# Patient Record
Sex: MALE | Race: BLACK OR AFRICAN AMERICAN | NOT HISPANIC OR LATINO | Employment: FULL TIME | URBAN - METROPOLITAN AREA
[De-identification: names, ages, dates, MRNs, and addresses within clinical notes are randomized per-mention and may not be internally consistent; named-entity substitution may affect disease eponyms.]

---

## 2020-07-28 ENCOUNTER — TELEPHONE (OUTPATIENT)
Dept: BARIATRICS | Facility: CLINIC | Age: 55
End: 2020-07-28

## 2020-07-28 NOTE — TELEPHONE ENCOUNTER
----- Message from Mata Herring sent at 7/28/2020  1:05 PM CDT -----  Patient called stating he was returning a missed call from Griselda regarding scheduling for 8/10, (next available date in epic was 8/13)requesting a callback [phone]

## 2020-07-28 NOTE — TELEPHONE ENCOUNTER
Called pt to let him know that banee will be virtual. Instructed pt on virtual appts and provided number to activate my [employer] account. Scheduled appts with prudence and RD for next and will switch to virtual once he activates my och

## 2020-07-28 NOTE — TELEPHONE ENCOUNTER
Called to schedule comp nut consult. Pt states he was told by Rick Herring that prudence will not be in clinic day of his appt and appt needs to be rescheduled or with a different provider. Will look into this. Pt coming from , would prefer virtual appts if possible. Will try to accomodate. Will call pt back once I confirm consult to schedule  Nut cons

## 2020-08-04 ENCOUNTER — CLINICAL SUPPORT (OUTPATIENT)
Dept: BARIATRICS | Facility: CLINIC | Age: 55
End: 2020-08-04
Payer: COMMERCIAL

## 2020-08-04 ENCOUNTER — OFFICE VISIT (OUTPATIENT)
Dept: BARIATRICS | Facility: CLINIC | Age: 55
End: 2020-08-04
Payer: COMMERCIAL

## 2020-08-04 VITALS — WEIGHT: 305 LBS | HEIGHT: 75 IN | BODY MASS INDEX: 37.92 KG/M2

## 2020-08-04 DIAGNOSIS — M19.90 ARTHRITIS: ICD-10-CM

## 2020-08-04 DIAGNOSIS — C80.1 CANCER: ICD-10-CM

## 2020-08-04 DIAGNOSIS — I25.10 CORONARY ARTERY DISEASE, ANGINA PRESENCE UNSPECIFIED, UNSPECIFIED VESSEL OR LESION TYPE, UNSPECIFIED WHETHER NATIVE OR TRANSPLANTED HEART: ICD-10-CM

## 2020-08-04 DIAGNOSIS — F41.9 ANXIETY: ICD-10-CM

## 2020-08-04 DIAGNOSIS — I10 HYPERTENSION, UNSPECIFIED TYPE: ICD-10-CM

## 2020-08-04 PROBLEM — F32.A DEPRESSION: Status: ACTIVE | Noted: 2020-08-04

## 2020-08-04 PROBLEM — I25.2 HISTORY OF MI (MYOCARDIAL INFARCTION): Status: ACTIVE | Noted: 2020-08-04

## 2020-08-04 PROBLEM — E66.01 MORBID OBESITY: Status: ACTIVE | Noted: 2020-05-12

## 2020-08-04 PROBLEM — Z85.46 PERSONAL HISTORY OF MALIGNANT NEOPLASM OF PROSTATE: Status: ACTIVE | Noted: 2020-08-04

## 2020-08-04 PROCEDURE — 99205 PR OFFICE/OUTPT VISIT, NEW, LEVL V, 60-74 MIN: ICD-10-PCS | Mod: 95,,, | Performed by: PHYSICIAN ASSISTANT

## 2020-08-04 PROCEDURE — 99499 UNLISTED E&M SERVICE: CPT | Mod: 95,,, | Performed by: DIETITIAN, REGISTERED

## 2020-08-04 PROCEDURE — 99205 OFFICE O/P NEW HI 60 MIN: CPT | Mod: 95,,, | Performed by: PHYSICIAN ASSISTANT

## 2020-08-04 PROCEDURE — 99499 NO LOS: ICD-10-PCS | Mod: 95,,, | Performed by: DIETITIAN, REGISTERED

## 2020-08-04 RX ORDER — ATORVASTATIN CALCIUM 40 MG/1
40 TABLET, FILM COATED ORAL EVERY MORNING
COMMUNITY

## 2020-08-04 RX ORDER — CARVEDILOL 6.25 MG/1
6.25 TABLET ORAL 2 TIMES DAILY
COMMUNITY

## 2020-08-04 RX ORDER — TRIAMTERENE/HYDROCHLOROTHIAZID 37.5-25 MG
TABLET ORAL
COMMUNITY
End: 2020-12-22 | Stop reason: ALTCHOICE

## 2020-08-04 RX ORDER — CLOPIDOGREL BISULFATE 75 MG/1
TABLET ORAL
COMMUNITY

## 2020-08-04 RX ORDER — HYDRALAZINE HYDROCHLORIDE 25 MG/1
25 TABLET, FILM COATED ORAL EVERY 12 HOURS
COMMUNITY

## 2020-08-04 RX ORDER — ESCITALOPRAM OXALATE 20 MG/1
20 TABLET ORAL EVERY MORNING
COMMUNITY

## 2020-08-04 RX ORDER — BUPROPION HYDROCHLORIDE 150 MG/1
150 TABLET ORAL EVERY MORNING
COMMUNITY

## 2020-08-04 RX ORDER — OLMESARTAN MEDOXOMIL 40 MG/1
40 TABLET ORAL EVERY MORNING
COMMUNITY

## 2020-08-04 NOTE — PROGRESS NOTES
The patient location is: home ([place])  The chief complaint leading to consultation is: morbid obesity    Visit type: audiovisual    Face to Face time with patient: 60 min  60 minutes of total time spent on the encounter, which includes face to face time and non-face to face time preparing to see the patient (eg, review of tests), Obtaining and/or reviewing separately obtained history, Documenting clinical information in the electronic or other health record, Independently interpreting results (not separately reported) and communicating results to the patient/family/caregiver, or Care coordination (not separately reported).         Each patient to whom he or she provides medical services by telemedicine is:  (1) informed of the relationship between the physician and patient and the respective role of any other health care provider with respect to management of the patient; and (2) notified that he or she may decline to receive medical services by telemedicine and may withdraw from such care at any time.    NUTRITIONAL CONSULT    Referring Physician: Dr. Canales  Reason for MNT Referral: Initial assessment for sleeve gastrectomy work-up    PAST MEDICAL HISTORY:   55 y.o. male. BMI 38.  States he has not always been overweight but with age and worsening joint pain he has gained weight.  The patient has tried balanced diet with three meals a day, exercising, portion control. Lowest adult weight. Presently at highest weight.     Pt states that he was working with a  last year at the gym, 4 days/week. She discussed exercise, diet and behavior changes with him at each session. He will attempt to get us the notes from these sessions to review and send to insurance as part of intensive multicomponent behavioral intervention.    Past Medical History:   Diagnosis Date    Anxiety     Arthritis     Cancer     Coronary artery disease     Depression 8/4/2020    Hyperlipidemia     Hypertension        CLINICAL  DATA:  55 y.o.-year-old Black or  male.  Height: 6 ft. 3 in.  Weight: 305 lbs  BMI: 38.1    Goal for Bariatric Surgery: to improve health, to improve quality of life, to lose weight and to prevent future medical conditions    NUTRITIONAL NEEDS: Pre op for bariatric surgery  [id number] Calories    Grams Protein    NUTRITION & HEALTH HISTORY:  Greatest challenge:     Current diet recall:    B: egg whites  L: tuna salad scoop over salad  - piece of fruit  D: tuna salad scoop with back of chips    Current Diet:  Meal pattern: 3 meals + snack  Protein supplements: Has used in the past  Snacking: Fruit. Eats chips very rarely, once/month. Not a big sweet tooth. Occ eats a chocolate bar or cookies less than once/month.  Vegetables: Likes a variety. Eats daily.  Fruits: Likes a variety. Eats daily.  Beverages: water and coffee without sugar. Soda once/month.  Dining out: Monthly. Mostly fast food, restaurants and take-out. Big Mac, or Grilled chicken and vegetables and potato from a restaurant.  Cooking at home: Daily. Weekly. Mostly baked, grilled and smothered meat, fish and vegetables.    Exercise:  Past exercise: 2019 4 day/sweek at the gym, led by a : stationary bike, walking on treadmill, circuit weights - low weight/high reps    Current Exercise: None. Has gym membership with stationary bike but has not been going d/t covid. No pool access. No gym equipment at home. He tries to go for walks occ but is very limited. He has tried seated chair exercise videos  Restrictions to exercise: knees and lower back pain, arthritis    Vitamins / Minerals / Herbs:  MV 50+      Labs:   none available at this time    Food Allergies:   None known    Social:  Works regular daytime shifts. Mostly seated.  Lives alone.  Grocery shopping and food prep done by the pt.  Patient unsure who will be supportive after surgery. He plans to take an Uber.  Alcohol: Socially.  Smoking:  None.    ASSESSMENT:  · Patient reports attempts at weight loss, only to regain lost weight.  · Patient demonstrated knowledge of healthy eating behaviors and exercise patterns; admits to not eating healthy and not exercising at this point.  · Patient demonstrates willingness to change lifestyle and make behavior modifications as evidenced by dietary changes, increased fruits, increased vegetables, more food preparation at [employer], healthier cooking at home and healthier snacking at home.        Barriers to Education: none    Stage of change: action    NUTRITION DIAGNOSIS:     Morbid Obesity related to Physical inactivity as evidence by BMI.    BARIATRIC DIET DISCUSSION/PLAN:  Discussed diet after surgery and related to patient's food record.  Reviewed nutrition guidelines for before and after surgery.  Answered all questions.  Continue to review Bariatric Nutrition Guidebook at home and call with any questions.  Work on Bariatric Nutrition Checklist.  Begin trying various protein supplements to determine preference.  5-6 meals per day.  Start including protein supplements in the diet plan daily.  Increase exercise.  Start shopping for bariatric vitamins & minerals.     Focus Goals:  Begin including protein shake 1-2 daily - lactose free list provided.  Recommended [employer] or [employer] seated chair exercise videos.    RECOMMENDATIONS:  Patient is Good candidate for bariatric surgery.    May need additional visit(s) with RD for MSD.    Patient verbalized understanding.    Expect good  compliance after surgery at this time.    Communicated nutrition plan with bariatric team.    SESSION TIME:  60 minutes

## 2020-08-04 NOTE — PROGRESS NOTES
BARIATRIC NEW PATIENT EVALUATION  The patient location is: home  The chief complaint leading to consultation is: consult  Visit type: audiovisual    Face to Face time with patient: 30 minutes of total time spent on the encounter, which includes face to face time and non-face to face time preparing to see the patient (eg, review of tests), Obtaining and/or reviewing separately obtained history, Documenting clinical information in the electronic or other health record, Independently interpreting results (not separately reported) and communicating results to the patient/family/caregiver, or Care coordination (not separately reported).         Each patient to whom he or she provides medical services by telemedicine is:  (1) informed of the relationship between the physician and patient and the respective role of any other health care provider with respect to management of the patient; and (2) notified that he or she may decline to receive medical services by telemedicine and may withdraw from such care at any time.    Notes:   CHIEF COMPLAINT:   Morbid Obesity Body mass index is 38.12 kg/m². and inability to maintain weight loss.    HISTORY OF PRESENT ILLNESS:  Cb Hernandez  is a 55 y.o. male presenting for morbid obesity Body mass index is 38.12 kg/m². and inability to maintain weight loss. Pt states that he has not always been overweight but with age and worsening joint pain he has gained weight.  The patient has tried balanced diet with three meals a day, exercising, portion control. Lowest adult weight. Presently at highest weight.     Pt goal: 250-260  IBW: 195    PAST MEDICAL HISTORY:  Past Medical History:   Diagnosis Date    Anxiety     Arthritis     Cancer     Coronary artery disease     Depression 8/4/2020    Hyperlipidemia     Hypertension          PAST SURGICAL HISTORY:  Past Surgical History:   Procedure Laterality Date    ELBOW SURGERY Bilateral     KNEE SURGERY Bilateral     PROSTATE SURGERY       SHOULDER SURGERY Bilateral        FAMILY HISTORY:  Family History   Problem Relation Age of Onset    Diabetes Mother     No Known Problems Father     No Known Problems Sister     No Known Problems Brother     No Known Problems Daughter     No Known Problems Son     No Known Problems Brother     No Known Problems Son        SOCIAL HISTORY:  Social History     Socioeconomic History    Marital status: Single     Spouse name: Not on file    Number of children: Not on file    Years of education: Not on file    Highest education level: Not on file   Occupational History    Not on file   Social Needs    Financial resource strain: Not on file    Food insecurity     Worry: Not on file     Inability: Not on file    Transportation needs     Medical: Not on file     Non-medical: Not on file   Tobacco Use    Smoking status: Never Smoker    Smokeless tobacco: Never Used   Substance and Sexual Activity    Alcohol use: Yes     Comment: socially    Drug use: Never    Sexual activity: Yes     Partners: Female   Lifestyle    Physical activity     Days per week: Not on file     Minutes per session: Not on file    Stress: Not on file   Relationships    Social connections     Talks on phone: Not on file     Gets together: Not on file     Attends Buddhism service: Not on file     Active member of club or organization: Not on file     Attends meetings of clubs or organizations: Not on file     Relationship status: Not on file   Other Topics Concern    Not on file   Social History Narrative    Not on file       MEDICATIONS:  Current Outpatient Medications   Medication Sig Dispense Refill    atorvastatin (LIPITOR) 40 MG tablet 1 tablet      buPROPion (WELLBUTRIN XL) 150 MG TB24 tablet 1 tablet in the morning      carvediloL (COREG) 25 MG tablet 2 tablet      clopidogreL (PLAVIX) 75 mg tablet 1 tablet      escitalopram oxalate (LEXAPRO) 20 MG tablet 1 tablet      hydrALAZINE (APRESOLINE) 25 MG tablet 1  tablet      olmesartan (BENICAR) 40 MG tablet 1 tablet      triamterene-hydrochlorothiazide 37.5-25 mg (MAXZIDE-25) 37.5-25 mg per tablet 1 tablet in the morning       No current facility-administered medications for this visit.        ALLERGIES:  Review of patient's allergies indicates:  Not on File    ROS:  Review of Systems   Constitutional: Negative for chills and fever.   HENT: Negative for sore throat and tinnitus.    Eyes: Negative for blurred vision and double vision.   Respiratory: Negative for cough and shortness of breath.    Cardiovascular: Negative for chest pain, palpitations and leg swelling.   Gastrointestinal: Negative for abdominal pain, constipation, diarrhea, heartburn, nausea and vomiting.   Genitourinary: Negative for dysuria and hematuria.   Musculoskeletal: Positive for back pain and joint pain. Negative for neck pain.   Skin: Negative for rash.   Neurological: Negative for dizziness, tingling and headaches.   Psychiatric/Behavioral: Negative for depression. The patient is not nervous/anxious.        PE:  There were no vitals filed for this visit.    Physical Exam  Constitutional:       Appearance: He is obese.   HENT:      Head: Normocephalic and atraumatic.      Nose: Nose normal.   Eyes:      Extraocular Movements: Extraocular movements intact.      Conjunctiva/sclera: Conjunctivae normal.   Neck:      Musculoskeletal: Normal range of motion.   Pulmonary:      Effort: Pulmonary effort is normal. No respiratory distress.   Neurological:      General: No focal deficit present.      Mental Status: He is alert and oriented to person, place, and time.   Psychiatric:         Mood and Affect: Mood normal.         Behavior: Behavior normal.         Thought Content: Thought content normal.         Judgment: Judgment normal.          DIAGNOSIS:  1. Morbid Obesity with Body mass index is 38.12 kg/m². and inability to maintain weight loss.  2. Co-morbidities: HLD,HTN    PLAN:  The patient is Good  candidate for Bariatric Surgery. he is interested in sleeve gastrectomy with Dr. Canales. The surgery and post-op care were discussed in detail with the patient. All questions were answered.    he understands that bariatric surgery is a tool to aid in weight loss and that he needs to be committed to the diet and exercise post-operatively for successful weight loss.  Discussed expected weight loss outcomes after surgery which is 50% of the excess weight on his frame.  Goal weight is 250#s.  Discussed with patient that bariatric surgery is not the easy way out and that it will take plenty of dedication on the patient's part to be successful. Also discussed the possibility of weight regain if the patient strays from the diet guidelines or exercise requirements. Patient verbalized understanding and wishes to proceed with the work-up.    ORDERS:  1. Chest X-Ray and EKG  2. Psychological Consult, Bariatric Dietician Consult and PCP Clearance  3. Bariatric Labs: BMP, CBC, Folate Serum, H. pylori, HgA1C, Hepatic Panel/LFT, Iron & TIBC, Lipid Profile, Magnesium, Phosphate, T3, T4, TSH, Free T4, Vitamin B12, and Vitamin B1.  4. No NSAIDS after surgery due to increased risk of stomach ulcers. Talk to your prescribing provider about alternative options for your pain.  5.Anticoagulation plan  6. ESS scale for sleep apnea completed by patient and reviewed.   Primary Physician: Bruce Lozoya MD  RTC: As scheduled.    60 minute visit, over 50% of time spent counseling patient face to face on surgical options, risks, benefits, expected diet, recommended exercise regimen, and expected weight loss.

## 2020-08-04 NOTE — PATIENT INSTRUCTIONS
Prior to surgery you will need to complete:  - Dietitian consult and follow up appointments as needed  - PCP clearance  - Labs  - Chest X-ray  - EKG  - Psychological evaluation, Please call psychiatry [phone] to schedule     In preparation for bariatric surgery, please complete the following:   · Discuss your current medications with your primary care provider, remember your medications will need to be crushed, chewable, or in liquid form for the first 3-6 months after a gastric bypass or sleeve.  For a gastric band, your medications will need to be crushed indefinitely.    · If you take a blood thinner such as: Coumadin (warfarin), Pradaxa (dabigatran), or Plavix (clopidogrel), you will need to speak with your prescribing provider on how or if this medication can be stopped before surgery.   · If you take a medication for depression or anxiety, you will need to begin crushing or opening the capsule 1-3 months prior to surgery.  Remember to discuss this with the psychologist or psychiatrist that you see.   · If you take medication for arthritis on a daily basis that is considered a non-steroidal anti-inflammatory (NSAID), please discuss with your prescribing physician an alternative medication.  After having gastric bypass or gastric sleeve, this group of medications is not appropriate to take due to increased risk of bleeding stomach ulcers.      DEFINITIONS  Appointments: Pre-scheduled meetings or consultations with any physician, advanced practice provider, dietitian, or psychologist, and labs, imaging studies, sleep studies, etc.   Late cancellation: Cancelling an appointment 24-48 hours prior to scheduled time.  No-Show appointment:  is when    You do NOT arrive to your appointment at the time its scheduled.   You call to cancel or cancel via [facility] less than 24 hours in advance of your scheduled appointment   You show up 15 minutes AFTER your scheduled appointment time without any notification of  being late.     POLICY  1. You are allowed up to 3 cancellations for appointments.    After 3 cancellations your case will be placed on hold for 2 months and after that time you can resume the program.   2. You are allowed only 1 no-show for an appointment.    You will be re-scheduled one time and if there is a 2nd no-show at any point, your case will be placed on hold for 3 months.  After 3 months you can resume the program.     3. Upon resuming the program after being placed on hold for either above mentioned reasons, if you have a late cancel or no show for any appointment, the bariatric team will review if youre an appropriate candidate for surgery at the monthly meeting.

## 2020-08-05 NOTE — PATIENT INSTRUCTIONS
PLAN:  Continue to review Bariatric Nutrition Guidebook at home and call with any questions.  Work on Bariatric Nutrition Checklist.  Begin trying various protein supplements to determine preference.  5-6 meals per day.  Start including protein supplements in the diet plan daily.  Increase exercise.  Start shopping for bariatric vitamins & minerals.     Focus Goals:  Begin including protein shake 1-2 daily - lactose free list provided.  Recommended [facility] or You Tube seated chair exercise videos.    Lactose-free Protein Supplements: Muscle Milk, Fairlife nutrition plan or Core Power, Total Lean from [facility], Nectar protein powder, Isopure protein powder from [facility], Protein 2.0    1200- 1500 calorie Sample meal plan  80-120g protein per day.   Protein drinks and bars: 0-4 grams sugar  Drink lots of water throughout the day and exercise!  MENU # 1  Breakfast: 2 eggs, 1 turkey sausage hazel, 1 apple  Snack: Atkins bar  Lunch: 2 roll-ups (sliced turkey, low-fat sliced cheese, mustard), 12 baby carrots dipped in 1 Tbsp natural peanut butter  Mid-Day Snack: ¼ cup unsalted almonds, ½ cup fruit  Dinner: 1 chicken thigh simmered in tomato sauce + 2 Tbsp mozzarella cheese, ½ cup black beans, 1/2 cup steamed carrots  Evening Snack: 1/2 cup grapes with 4 cubes low-fat cheddar cheese   ___________________________________________________  MENU # 2  Breakfast: 200 calorie protein drink  Mid-morning snack : ¼ cup unsalted nuts, medium banana  Lunch: 3oz tuna or chicken salad made with 2 tbsp light higgins, over salad with tomatoes and cucumbers.   Snack: low-fat cheese stick  Dinner: 3oz hamburger hazel, slice of low-fat cheese, 1 cup boiled yellow squash and zucchini.   Snack: 6oz light yogurt  _______________________________________________________  Menu #3  Breakfast: 6oz plain Greek yogurt + fruit (½ banana, ½ cup fruit - pineapple, blueberries, strawberries, peach), may add Splenda to wes.  Lunch: Grilled  chicken breast w/ slice  low-fat pepper alicia cheese, 1/2 cup grilled/baked asparagus and small salad with Salad Spritzer.    Mid-Day snack: 200 calorie protein drink   Dinner: 4oz Grilled fish, ½ cup white beans, ½ cup cooked spinach  Evening Snack: fudgsicle no-sugar-added    Menu # 4  Breakfast: 1 scoop vanilla protein powder + 4oz skim milk + 4oz coffee   Snack: Pure protein bar  Lunch: 2 Lettuce tacos: 3oz seasoned ground turkey wrapped in a Ravindra lettuce leaves with 1 Tbsp shredded cheese and dollop low-fat sour cream  Snack: ½ cup cottage cheese, ½ cup pineapple chunks  Dinner: Shrimp omelet: 2 eggs, ½ cup shrimp, green onions, and shredded cheese  ______________________________________________________  Menu #5  Breakfast: 1 cup low-fat cottage cheese, ½ cup peaches (no sugar added)  Snack: 1 apple, 4 cubes cheese  Lunch: 3oz baked pork chop, 1 cup okra and tomato stew  Snack: 1/2 cup black beans + salsa + dollop light sour cream  Dinner: Caprese chicken salad: 3 oz chicken breast, 1oz fresh mozzarella, sliced tomato, 1 Tbsp olive oil, basil  Snack: sugar-free popsicle    Menu #6  Breakfast: ½ cup part-skim ricotta cheese, 2 Tbsp sugar-free strawberry preserves, sprinkle of slivered almonds  Snack: 1 orange  Lunch: 3 oz canned chicken, 1oz shredded cheddar cheese, ½  cup black beans, 2 Tbsp salsa  Snack: 200 calorie Protein drink  Dinner: 4 oz grilled salmon steak, over mixed green salad with 2 tbsp light dressing    Meal Ideas for Regular Bariatric Diet  *Recipes and products available at [email or web address]      Breakfast: (15-20g protein)    - Egg white omelet: 2 egg whites or ½ cup Egg Beaters. (Optional proteins: cheese, shrimp, black beans, chicken, sliced turkey) (Optional veggies: tomatoes, salsa, spinach, mushrooms, onions, green peppers, or small slice avocado)     - Egg and sausage: 1 egg or ¼ cup Egg Beaters (any variety), with 1 hazel or 2 links of Turkey sausage or Veggie breakfast sausage ([employer]se Farms or  Boca)    - Crust-less breakfast quiche: To make a glass pie dish, mix 4oz part skim Ricotta, 1 cup skim milk, and 2 eggs as your base. Add protein: shredded cheese, sliced lean ham or turkey, turkey watts/sausage. Add veggies: tomato, onion, green onion, mushroom, green pepper, spinach, etc.    - Yogurt parfait: Mix 1 - 6oz container Dannon Light N Fit vanilla yogurt, with ¼ cup crushed unsalted nuts    - Cottage cheese and fruit: ½ cup part-skim cottage cheese or ricotta cheese topped with fresh fruit or sugar free preserves     - Eda Burton's Vanilla Egg custard* (add 2 Tbsp instant coffee granules to make Cappuccino Custard*)    - Hi-Protein café latte (skim milk, decaf coffee, 1 scoop protein powder). Optional to add Sugar free syrup or extract flavoring.    - Breakfast Lox: spread fat free cream cheese on slices of smoked salmon. Serve over scrambled or egg over easy (sauteed with nonstick cookspray) OR on a cucumber slice    - Eggwhich: Scramble or cook 1 large egg over easy using nonstick cookspray. Place between 2 slices of Omani watts and low fat cheese.     Lunch: (20-30g protein)    - ½ cup Black bean soup (Homemade or Progresso), with ¼ cup shredded low-fat cheese. Top with chopped tomato or fresh salsa.     - Lean deli turkey breast and low-fat sliced cheese, mustard or light higgins to moisten, rolled up together, or wrapped in a Ravindra lettuce leaf    - Chicken salad made from dinner leftovers, moisten with low-fat salad dressing or light higgins. Also try leftover salmon, shrimp, tuna or boiled eggs. Serve ½ cup over dark green salad    - Fat-free canned refried beans, topped with ¼ cup shredded low-fat cheese. Top with chopped tomato or fresh salsa.     - Greek salad: Top mixed greens with 1-2oz grilled chicken, tomatoes, red onions, 2-3 kalamata olives, and sprinkle lightly with feta cheese. Spritz with Balsamic vinegar to taste.     - Crust-less lunch quiche: To make a glass pie dish, mix 4oz part  skim Ricotta, 1 cup skim milk, and 2 eggs as your base. Add protein: shredded cheese, sliced lean ham or turkey, shrimp, chicken. Add veggies: tomato, onion, green onion, mushroom, green pepper, spinach, artichoke, broccoli, etc.    - Pizza bake: spread a  delfin yran mushroom with tomato sauce, low-fat shredded mozzarella and turkey pepperoni or Slovenian watts. Add any veggies. Roast for 10-15 minutes, until cheese melted.     - Cucumber crab bites: Spread ¼ cup crab dip (lump crabmeat + light cream cheese and green onions) over sliced cucumber.     - Chicken with light spinach and artichoke dip*: Puree in : 6oz cooked and drained spinach, 2 cloves garlic, 1 can cannelloni beans, ½ cup chopped green onions, 1 can drained artichoke hearts (not marinated in oil), lemon juice and basil. Mix in 2oz chopped up chicken.    Supper: (20-30g protein)    - Serve grilled fish over dark green salad tossed with low-fat dressing, served with grilled asparagus pickering     - Rotisserie chicken salad: served with sliced strawberries, walnuts, fat-free feta cheese crumbles and 1 tbsp Haros Own Light Raspberry [place] Vinaigrette    - Shrimp cocktail: Dip cold boiled shrimp in homemade low-sugar cocktail sauce (1/2 cup Lea One Carb ketchup, 2 tbsp horseradish, 1/4 tsp hot sauce, 1 tsp Worcestershire sauce, 1 tbsp freshly-squeezed lemon juice). Serve with dark green salad, walnuts, and crumbled blue cheese drizzled with olive oil and Balsamic vinegar    - Tuna Melt: Spread tuna salad onto 2 thick slices of tomato. Top with low-fat cheese and broil until cheese is melted. May also be made with chicken salad of shrimp salad. [place] with different types of cheeses.    - Chicken or beef fajitas (no tortilla, rice, beans, chips). Top meat and veggies w/ fresh salsa, fat free sour cream.     - Homemade low-fat Chili using extra lean ground beef or ground turkey. Top with shredded cheese and salsa as desired. May add  dollop fat-free sour cream if desired    - Chicken parmesan: Top chicken breast w/ low sugar marinara sauce, mozzarella cheese and bake until chicken reaches 165*.  Serve w/ spaghetti SQUASH or Armenian cut green beans    - Dinner Omelet with shrimp or chicken and onion, green peppers and chives.    - No noodle lasagna: Use sliced zucchini or eggplant in place of noodles.  Layer with part skim ricotta cheese and low sugar meat sauce (use very lean ground beef or ground turkey).    - Mexican chicken bake: Bake chunks of chicken breast or thigh with taco seasoning, Pace brand enchilada sauce, green onions and low-fat cheese. Serve with ¼ cup black beans or fat free refried beans topped with chopped tomatoes or salsa.    - Rogers frozen meatballs, simmered in Classico Marinara sauce. Different flavors of salsa or spaghetti sauce create different dishes! Sprinkle with parmesan cheese. Serve with grilled or steamed veggies, or a dark green salad.    - Simmer boneless skinless chicken thigh chunks in Classico Marinara sauce or roasted salsa until tender with chopped onion, bell pepper, garlic, mushrooms, spinach, etc.     - Hamburger or veggie burger, without the bun, dressed the way you like. Served with grilled or steamed veggies.    - Eggplant parmesan: Bake slices of eggplant at 350 degrees for 15 minutes. Layer tomato sauce, sliced eggplant and low-fat mozzarella cheese in a baking dish and cover with foil. Bake 30-40 more minutes or until bubbly. Uncover and bake at 400 degrees for about 15 more minutes, or until top is slightly crisp.    - Fish tacos: grilled/baked white fish, wrapped in Ravindra lettuce leaf, topped with salsa, shredded low-fat cheese, and light coleslaw.    - Chicken savage: Sprinkle chicken w/ 1 tsp of hidden [place] ranch dip mix. Then grill chicken and top with black beans, salsa and 1 tsp fat free sour cream.     - Cauliflower pizza crust: Use cauliflower as crust (see recipe on jack,  no flour!). Top w/ low fat cheese, turkey pepperoni and veggies and bake again    - chicken or turkey crust pizza: use ground chicken or turkey instead of cauliflower, spread in [place] and bake at 350 for about 20-30 minutes(may want to add garlic, black pepper, oregano and other herbs to ground meat mixture).  Remove and top w/ low fat cheese, turkey pepperoni and veggies and bake again for another 10 minutes or until cheese is browned.     Snacks: (100-200 calories; >5g protein)    - 1 low-fat cheese stick with 8 cherry tomatoes or 1 serving fresh fruit  - 4 thin slices fat-free turkey breast and 1 slice low-fat cheese  - 4 thin slices fat-free honey ham with wedge of melon  - 6-8 edamame pods (equivalent to about 1/4 cup edamame without pods).   - 1/4 cup unsalted nuts with ½ cup fruit  - 6-oz container Dannon Light n Fit vanilla yogurt, topped with 1oz unsalted nuts         - apple, celery or baby carrots spread with 2 Tbsp PB2  - apple slices with 1 oz slice low-fat cheese  - Apple slices dipped in 2 Tbsp of PB2  - celery, cucumber, bell pepper or baby carrots dipped in ¼ cup hummus bean spread or light spinach and artichoke dip (*recipe in lunch section)  - celery, cucumber, baby carrots dipped in high protein greek yogurt (Mix 16 oz plain greek yogurt + 1 packet of hidden valley ranch dip mix)  - Ephraim Links Beef Steak - 14g protein! (similar to beef jerky)  - 2 wedges Laughing Cow - Light Herb & Garlic Cheese with sliced cucumber or green bell pepper  - 1/2 cup low-fat cottage cheese with ¼ cup fruit or ¼ cup salsa  - RTD Protein drinks: Atkins, Low Carb Slim Fast, EAS light, Muscle Milk Light, etc.  - Homemade Protein drinks: GNC Soy95, Isopure, Nectar, UNJURY, Whey Gourmet, etc. Mix 1 scoop powder with 8oz skim/1% milk or light soymilk.  - Protein bars: Atkins, EAS, Pure Protein, Think Thin, Detour, etc. Must have 0-4 grams sugar - Read the label.    Takeout Options: No more than twice/week  Deli - Lisa  (no pasta or rice), meats, cheeses. Roasted chicken. Lox (salmon)    Mexican - Platters which don't include tortillas, chips, or rice. Go easy on the beans. Example: Fajitas without the tortillas. Ask the  not to bring chips to the table if they are too tempting.    Greek - Meat or fish and vegetable, but no bread or rice. Including hummus, baba ganoush, etc, is OK. Most sit-down Greek restaurants can provide you with cucumber slices for dipping instead of salty bread.    Fast Food (Avoid as much as possible) - Salads (no croutons and limit salad dressing to 2 tbsp), grilled chicken sandwich without the bun and ask for no higgins. Margaritas low fat chili or Taco Bell pintos and cheese.    BBQ - The meats are fine if you ask for sauces on the side, but most of the traditional side dishes are loaded with carbs. Sumeet slaw, baked beans and BBQ sauce are typically made with sugar.    Chinese - Nothing deep-fried, no rice or noodles. Many Chinese sauces have starch and sugar in them, so you'll have to use your judgement. If you find that these sauces trigger cravings, or cause Dumping, you can ask for the sauce to be made without sugar or just use soy sauce.

## 2020-08-07 ENCOUNTER — TELEPHONE (OUTPATIENT)
Dept: BARIATRICS | Facility: CLINIC | Age: 55
End: 2020-08-07

## 2020-08-07 NOTE — TELEPHONE ENCOUNTER
----- Message from Faith Rudolph PA-C sent at 8/7/2020 12:50 PM CDT -----  Why does he need either?  ----- Message -----  From: Kenisha Miranda RN  Sent: 8/6/2020   4:54 PM CDT  To: Faith Rudolph PA-C    Should we get a stress test or cardiology clearance too plese.  Thanks  ----- Message -----  From: Faith Rudolph PA-C  Sent: 8/4/2020   8:32 AM CDT  To: Klaud[facility] Cuevas Staff    Please contact pt to set up consult orders.  BM

## 2020-08-10 ENCOUNTER — OFFICE VISIT (OUTPATIENT)
Dept: PSYCHIATRY | Facility: CLINIC | Age: 55
End: 2020-08-10
Payer: COMMERCIAL

## 2020-08-10 DIAGNOSIS — I25.2 HISTORY OF MI (MYOCARDIAL INFARCTION): ICD-10-CM

## 2020-08-10 DIAGNOSIS — M19.90 ARTHRITIS: ICD-10-CM

## 2020-08-10 DIAGNOSIS — E66.01 MORBID OBESITY DUE TO EXCESS CALORIES: ICD-10-CM

## 2020-08-10 DIAGNOSIS — Z01.818 PREOP EXAMINATION: Primary | ICD-10-CM

## 2020-08-10 DIAGNOSIS — I25.10 CORONARY ARTERY DISEASE, ANGINA PRESENCE UNSPECIFIED, UNSPECIFIED VESSEL OR LESION TYPE, UNSPECIFIED WHETHER NATIVE OR TRANSPLANTED HEART: ICD-10-CM

## 2020-08-10 DIAGNOSIS — I10 ESSENTIAL HYPERTENSION: ICD-10-CM

## 2020-08-10 DIAGNOSIS — Z56.6 WORK-RELATED STRESS: ICD-10-CM

## 2020-08-10 DIAGNOSIS — Z86.59 HISTORY OF ANXIETY: ICD-10-CM

## 2020-08-10 PROCEDURE — 90791 PR PSYCHIATRIC DIAGNOSTIC EVALUATION: ICD-10-PCS | Mod: 95,,, | Performed by: PSYCHOLOGIST

## 2020-08-10 PROCEDURE — 90791 PSYCH DIAGNOSTIC EVALUATION: CPT | Mod: 95,,, | Performed by: PSYCHOLOGIST

## 2020-08-10 SDOH — SOCIAL DETERMINANTS OF HEALTH (SDOH): OTHER PHYSICAL AND MENTAL STRAIN RELATED TO WORK: Z56.6

## 2020-08-10 NOTE — PROGRESS NOTES
[employer] ([place]) - Direct to [email or web address]  The patient location is: Home ([place])  The chief complaint leading to consultation is: Presurgical psychological evaluation prior to bariatric surgery    Visit type: audiovisual    Face to Face time with patient: 35  50 minutes of total time spent on the encounter, which includes face to face time and non-face to face time preparing to see the patient (eg, review of tests), Obtaining and/or reviewing separately obtained history, Documenting clinical information in the electronic or other health record, Independently interpreting results (not separately reported) and communicating results to the patient/family/caregiver, or Care coordination (not separately reported).     Each patient to whom he or she provides medical services by telemedicine is:  (1) informed of the relationship between the physician and patient and the respective role of any other health care provider with respect to management of the patient; and (2) notified that he or she may decline to receive medical services by telemedicine and may withdraw from such care at any time.    Notes: N/A    Psychiatry Initial Visit (PhD/LCSW)   Diagnostic Interview - CPT 89057     NAME: Cb Hernandez  MRN: [id number]  : 1965    Date: 08/10/2020    Site: [facility]     Referral source: Ze Canales M.D.    Clinical status of patient: Outpatient     Mr. Cb Hernandez, a 55-year-old male, presented for initial evaluation visit. Before this evaluation was initiated, the purposes and process of the assessment and the limits of confidentiality were discussed with the patient who expressed understanding of these issues and orally consented to proceed with the evaluation.     Chief complaint/reason for encounter: Routine psychological evaluation prior to bariatric surgery.     Type of surgery sought:  Sleeve    History of present illness:  Mr. Cb Hernandez is a 55-year-old male who is pursuing bariatric  surgery to improve his health and quality of life.  He reports a history of anxiety from work stress and marital issues, for which he was prescribed psychotropic medication by his PCP.  Although both stressors have resolved, he continues to take the medication for maintenance.  He attended marital counseling prior to his divorce and individual psychotherapy during his divorce, but otherwise denied past psychiatric history and treatment.  He currently reports no significant psychiatric distress or adjustment issues.  He denied any history of suicidality or non-suicidal self-injury.  He has attempted making positive lifestyle changes in anticipation for surgery, with reported benefit.  The patient has a Body Mass Index of 38.12 as documented by the referring provider.    Mr. Hernandez has struggled with weight on and off for the past 10 years.  Factors that have contributed to his weight gain over the years include:  Lack of exercise (because I would be hurt or have some type of injury).  He did not identify many problematic eating patterns or dietary issues.  He denied problems with late-night eating and grazing behaviors.  Mr. Hernandez does not consider himself an emotional eater.  He has tried many weight loss methods on his own (i.e., balanced diet, exercise, portion control) with limited success, and believes that his biggest weight loss challenge is lack of exercise due to sports injuries (I have had 5 knee surgeries, both elbows, both shoulders theyre all arthritic.).  His motivation for seeking surgery now is improve health and longevity (to add more days to my life).  His postsurgical goals include walking his 17-year-old daughter down the aisle one day, reducing medication, reducing pressure from his joints, and feeling more energetic.    Mr. Hernandez had an initial consultation with Faith Rudolph PA-C, on 08/04/2020.  He also met with bariatric nutritionist Griselda Curiel RD, and reports that he has  made the following lifestyle changes since beginning the bariatric program:  Eating vegetables, eating chicken, portion control, drinking water, and walking more.  He has been drinking protein shakes for lunch for the last week.  He must continue meeting with Ms. Curiel to demonstrate the implementation of lifestyle changes prior to clearance for bariatric surgery.  He chose the gastric sleeve because it would be less invasive than the gastric bypass surgery.  He described the procedures as cutting a portion of your stomach out and making it smaller.  He identified risks of surgery including leakage.  He understood that he needs to focus on protein intake after surgery, which includes chicken, lean meats.  He noted that he will need to stay away from fried foods, fatty foods, junk after surgery.  He hopes to lose 30-40 lb. and expects it will take about a year.  He plans to take a couple of days to recover after surgery.  He has not family in town, so he has no point person identified to help him during recovery.      Medical History:  Hypertension, unspecified type; Coronary artery disease, angina presence unspecified, unspecified vessel or lesion type, unspecified whether native or transplanted heart; Cancer; Arthritis    Current medications and drug reactions:  see medication list.    Pain:  7/10 (I have chronic back pain, chronic knee pain.)    Psychiatric Symptoms:  Depression:  His score on the PHQ-8 was a 0, which indicates minimal depression.  He denied episodes of depressed mood or depression-related anhedonia.  Based on his reports, his symptoms do not meet full criteria for Major Depressive Disorder.  Leatha/Hypomania:  Denied.  He denied periods of elevated mood or abnormally increased energy or goal-directed activity.  Anxiety:  His score on the ELHMA-7 was a 0, which indicates minimal anxiety.  He acknowledged experiencing anxiety several years ago when he was traveling 40 weeks per year  for work and dealing with marital issues.  He was placed on medication by his PCP in order to help reduce stress and hypertension.  Since he has  and stopped traveling for work, he notes that his anxiety and stress are at manageable levels.  He continues to be on medication for maintenance, but is hopeful he will be able to reduce it completely in the future.  Based on his reports, his symptoms do not meet full criteria for Generalized Anxiety Disorder.  He will be assigned diagnoses of History of Anxiety and Work Stress.  Thoughts:  Denied delusions, hallucinations.  Suicidal thoughts/behaviors:  Denied.  Self-injury:  Denied.  Sleep:  Denied problems.  His past problems with sleep apnea have resolved with a new bed.  Cognitive functioning:  Denied problems.  Eating disorders:  Bulimia:  He denied recurrent episodes of binge eating and inappropriate compensatory behaviors.    Binge-eating episodes:  He denied eating excessive amounts of food within a discrete time period with a lack of control during eating.      Current psychosocial stressors:  During the day, I have a high-pressure job.  Its not as stressful as when I was traveling.  I think my job is just stressful anyways its about a 5/10.  He denied stressors outside of work.  Report of coping skills:  I listen to music.  I try to go out for a walk.  Or I meditate.  I take deep breaths.  He enjoys golfing, going to the movies, and sitting by water.  Support system:  Its just me.  I depend on myself.  Strengths and liabilities:  Strength: Patient accepts guidance/feedback, Strength: Patient is expressive/articulate., Strength: Patient is intelligent., Strength: Patient is motivated for change., Strength: Patient has reasonable judgment., Strength: Patient is stable., Liability: Patient has no suport network.    Current and past substance use:  Alcohol: He drinks on social occasions approximately once per month (1.5 drinks); denied history of  abuse or dependency.   Drugs: Denied current use; denied history of abuse or dependency.  Tobacco: None.   Caffeine: He drinks one coffee per day.    Current Psychiatric Treatment:  Medications:  He is currently prescribed Wellbutrin and Lexapro by his primary care physician (Dr. Bruce Lozoya - outside of [facility], [phone]).  He has been taking these medications for the past 10-15 years.  He is taking medication due to hypertension and stress from high-stress jobs (We did this to make sure my stress levels stay off).  Psychotherapy:  Denied.    Psychiatric History:  Previous diagnosis:  Depression, Anxiety - I dont have a history of depression.  Yevgeniy never been diagnosed with depression.  I dont feel like Yevgeniy had that ever When I was traveling a lot, I was traveling 40 weeks a year that was when I was , but now Im .  Im not stressed.  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  But I just do that (medication) for maintenance.  Previous hospitalizations:  Denied.  History of outpatient treatment:  He attended marital counseling prior to his divorce.  He attended individual therapy during his divorce.  Previous suicide attempt:  Denied.  Family history of psychiatric illness:  None reported.    Trauma history:  Denied.    Stressors history:  He lost his mother in April, and lost his father a few years ago.    Social history (marriage, employment, etc.):  Mr. Hernandez was born and raised in [place], [place] by his biological mother and father along with two younger brothers and an older sister.  He described his childhood as great didnt have anything but we worked hard.  He denied childhood trauma, abuse, and neglect.  He did alright in school (C student) and earned a Bachelors degree in Business Administration.  He is currently working as a  in the automotive field.  He is not on disability and finances are stable.  He is not , but was  three years  ago.  He is currently single.  He has three children (ages 25, 25, 17).  He currently lives by himself.  Gnosticist is important to him and he identifies as Cheondoism.  He considers his epifanio to be a source of support.    Legal history:  Denied.    Mental Status Exam:   General appearance:  appears stated age, neatly dressed, well groomed  Speech:  normal rate and tone  Level of cooperation:  cooperative  Thought processes:  logical, goal-directed  Mood:  euthymic  Thought content:  no illusions, no visual hallucinations, no auditory hallucinations, no delusions, no active or passive homicidal thoughts, no active or passive suicidal ideation, no obsessions, no compulsions, no violence  Affect:  appropriate  Orientation:  oriented to person, place, and date  Memory:  Recent memory:  3 of 3 objects after brief delay.    Remote memory - intact  Attention span and concentration:  spelled HOUSE forward (ESOUOH backwards)  Fund of general knowledge: 3 of 3 recent presidents  Abstract reasoning:    Similarities: abstract.    Proverbs: abstract.  Judgment and insight: fair  Language:  intact    Diagnostic Impression:    ICD-10-CM ICD-9-CM   1. Preop examination  Z01.818 V72.84   2. Morbid obesity due to excess calories  E66.01 278.01   3. Essential hypertension  I10 401.9   4. Coronary artery disease, angina presence unspecified, unspecified vessel or lesion type, unspecified whether native or transplanted heart  I25.10 414.00   5. History of MI (myocardial infarction)  I25.2 412   6. Arthritis  M19.90 716.90   7. BMI 38.0-38.9,adult  Z68.38 V85.38   8. History of anxiety  Z86.59 V11.8   9. Work-related stress  Z56.6 V62.1       Summary/Conclusion:   There are no overt psychological contraindications for proceeding with bariatric surgery.  Mr. Hernandez has a history of anxiety (due to work and marital stress) that has been well-managed with psychotropic medication.  His stressors have also resolved so that he does not  experience any current psychiatric problems or major adjustment issues.  Regarding bariatric surgery, he has reasonable expectations for surgery and has already begun making appropriate lifestyle changes in anticipation for surgery.  He reports limited social support and was encouraged to consider the bariatric support group for assistance if needed.  He has verbalized appropriate awareness and commitment to the necessary behavioral changes associated with bariatric surgery and appears willing to comply with long-term lifestyle changes. There are no recommendations for psychological treatment at this time. Mr. Hernandez is aware of resources available should his needs change in the future.    Recommendations:    Clear   Mr. Hernandez is psychologically cleared to proceed with bariatric surgery.    Due to limited social support, Mr. Hernandez was encouraged to consider attending the bariatric support group.  This is not a requirement prior to clearance for surgery.   Recommendations    Conditions need to be met    Not a candidate        Please see Psychological Testing report available in Notes tab of Chart Review in Epic for results of psychological testing.      Length of Session:  35 minutes

## 2020-08-10 NOTE — LETTER
August 10, 2020        [facility] STAFF             [facility]  [address]  [facility] [address]  Phone: [phone]   Patient: Cb Hernandez   MR Number: [id number]   YOB: 1965   Date of Visit: 8/10/2020       Dear  Staff:    Thank you for referring Cb Hernandez to me for evaluation. Below are the relevant portions of my assessment and plan of care.    Mr. Hernandez is psychologically cleared to proceed with bariatric surgery.  He has a history of anxiety (due to work and marital stress) that has been well-managed with psychotropic medication.  His stressors have also resolved so that he does not experience any current psychiatric problems or major adjustment issues.  There are no recommendations for psychological intervention at this time.  Due to limited social support, Mr. Hernandez was encouraged to consider attending the bariatric support group.  This is not a requirement prior to clearance for surgery.       If you have questions, please do not hesitate to call me. I look forward to following Cb along with you.    Sincerely,      Fior Santana, PhD           CC  No Recipients

## 2020-08-11 ENCOUNTER — CLINICAL SUPPORT (OUTPATIENT)
Dept: BARIATRICS | Facility: CLINIC | Age: 55
End: 2020-08-11
Payer: COMMERCIAL

## 2020-08-11 PROCEDURE — 99499 NO LOS: ICD-10-PCS | Mod: 95,,, | Performed by: DIETITIAN, REGISTERED

## 2020-08-11 PROCEDURE — 99499 UNLISTED E&M SERVICE: CPT | Mod: 95,,, | Performed by: DIETITIAN, REGISTERED

## 2020-08-11 NOTE — PROGRESS NOTES
The patient location is: home ([place])  The chief complaint leading to consultation is: obesity    Visit type: audiovisual    Face to Face time with patient: 30 min  30 minutes of total time spent on the encounter, which includes face to face time and non-face to face time preparing to see the patient (eg, review of tests), Obtaining and/or reviewing separately obtained history, Documenting clinical information in the electronic or other health record, Independently interpreting results (not separately reported) and communicating results to the patient/family/caregiver, or Care coordination (not separately reported).         Each patient to whom he or she provides medical services by telemedicine is:  (1) informed of the relationship between the physician and patient and the respective role of any other health care provider with respect to management of the patient; and (2) notified that he or she may decline to receive medical services by telemedicine and may withdraw from such care at any time.      NUTRITION NOTE     Referring Physician: Ze Canales M.D.  Reason for MNT Referral: 12 months Medically Supervised Diet pending Gastric Sleeve     Patient presents for f/u 1 week after his initial consult. We were going to attempt to have 12 weekly MSD for insurance but we found out that his insurance requires visits once/month for 12 months. We spent a while discussing previous visits he has had at a gym with a  discussing weight loss and diet, however there is no documentation from these visits other than his attendance. He states he is making good food choices. No exercise d/t chronic pain. Discussed the importance of trying various protein shakes in preparation for surgery pre op liquid diet.     CLINICAL DATA:  55 y.o. male. 6 ft. 3 in.     Current Weight: 300 lbs (self reported)  Weight Change Since Initial Visit: - 5 lbs  Ideal Body Weight: 182 lbs  BMI: 37    Plan:  Consistency with diet  plan.  Try various protein supplements to determine preference. Gave lactose free suggestions.  Incorporate seated chair exercises.    Follow up in one month for MSD.     SESSION TIME:  30 minutes

## 2020-08-12 ENCOUNTER — HOSPITAL ENCOUNTER (OUTPATIENT)
Dept: CARDIOLOGY | Facility: HOSPITAL | Age: 55
Discharge: HOME | End: 2020-08-12
Payer: COMMERCIAL

## 2020-08-12 ENCOUNTER — HOSPITAL ENCOUNTER (OUTPATIENT)
Dept: RADIOLOGY | Facility: HOSPITAL | Age: 55
Discharge: HOME | End: 2020-08-12
Attending: PHYSICIAN ASSISTANT
Payer: COMMERCIAL

## 2020-08-12 DIAGNOSIS — I10 HYPERTENSION, UNSPECIFIED TYPE: ICD-10-CM

## 2020-08-12 DIAGNOSIS — I25.10 CORONARY ARTERY DISEASE, ANGINA PRESENCE UNSPECIFIED, UNSPECIFIED VESSEL OR LESION TYPE, UNSPECIFIED WHETHER NATIVE OR TRANSPLANTED HEART: ICD-10-CM

## 2020-08-12 DIAGNOSIS — M19.90 ARTHRITIS: ICD-10-CM

## 2020-08-12 DIAGNOSIS — F41.9 ANXIETY: ICD-10-CM

## 2020-08-12 DIAGNOSIS — C80.1 CANCER: ICD-10-CM

## 2020-08-12 PROCEDURE — 93005 ELECTROCARDIOGRAM TRACING: CPT

## 2020-08-12 PROCEDURE — 71046 X-RAY EXAM CHEST 2 VIEWS: CPT | Mod: 26,,, | Performed by: RADIOLOGY

## 2020-08-12 PROCEDURE — 71046 XR CHEST PA AND LATERAL: ICD-10-PCS | Mod: 26,,, | Performed by: RADIOLOGY

## 2020-08-12 PROCEDURE — 93010 ELECTROCARDIOGRAM REPORT: CPT | Mod: ,,, | Performed by: INTERNAL MEDICINE

## 2020-08-12 PROCEDURE — 71046 X-RAY EXAM CHEST 2 VIEWS: CPT | Mod: TC

## 2020-08-12 PROCEDURE — 93010 EKG 12-LEAD: ICD-10-PCS | Mod: ,,, | Performed by: INTERNAL MEDICINE

## 2020-08-13 ENCOUNTER — TELEPHONE (OUTPATIENT)
Dept: BARIATRICS | Facility: CLINIC | Age: 55
End: 2020-08-13

## 2020-08-13 NOTE — TELEPHONE ENCOUNTER
----- Message from Faith Rudolph PA-C sent at 8/7/2020 12:51 PM CDT -----  Cards clearance  ----- Message -----  From: Kenisha Miranda RN  Sent: 8/6/2020   4:54 PM CDT  To: Faith Rudolph PA-C    Should we get a stress test or cardiology clearance too plese.  Thanks  ----- Message -----  From: Faith Rudolph PA-C  Sent: 8/4/2020   8:32 AM CDT  To: [facility] Staff    Please contact pt to set up consult orders.  BM

## 2020-08-13 NOTE — TELEPHONE ENCOUNTER
Order entered for referral to cardiology to get clearance for bariatric surgery per UZAIR's request.

## 2020-08-13 NOTE — TELEPHONE ENCOUNTER
Phoned patient and received a recorded message that it was [facility] office.  Left a very brief VM to RC to discuss some issues, get another phone number and a preferred pharmacy to prescribe medication to.

## 2020-08-14 ENCOUNTER — TELEPHONE (OUTPATIENT)
Dept: BARIATRICS | Facility: CLINIC | Age: 55
End: 2020-08-14

## 2020-08-14 DIAGNOSIS — E87.6 LOW BLOOD POTASSIUM: ICD-10-CM

## 2020-08-14 DIAGNOSIS — E87.6 LOW SERUM POTASSIUM LEVEL: ICD-10-CM

## 2020-08-14 DIAGNOSIS — R76.8 HELICOBACTER PYLORI ANTIBODY POSITIVE: Primary | ICD-10-CM

## 2020-08-14 DIAGNOSIS — R79.89 LOW VITAMIN D LEVEL: Primary | ICD-10-CM

## 2020-08-14 RX ORDER — CLARITHROMYCIN 500 MG/1
500 TABLET, FILM COATED ORAL EVERY 12 HOURS
Qty: 28 TABLET | Refills: 0 | Status: SHIPPED | OUTPATIENT
Start: 2020-08-14 | End: 2020-08-28

## 2020-08-14 RX ORDER — AMOXICILLIN 500 MG/1
1000 TABLET, FILM COATED ORAL EVERY 12 HOURS
Qty: 56 TABLET | Refills: 0 | Status: SHIPPED | OUTPATIENT
Start: 2020-08-14 | End: 2020-08-28

## 2020-08-14 RX ORDER — POTASSIUM CHLORIDE 20 MEQ/1
20 TABLET, EXTENDED RELEASE ORAL 2 TIMES DAILY
Qty: 6 TABLET | Refills: 0 | Status: SHIPPED | OUTPATIENT
Start: 2020-08-14 | End: 2020-08-17

## 2020-08-14 RX ORDER — OMEPRAZOLE 20 MG/1
20 CAPSULE, DELAYED RELEASE ORAL 2 TIMES DAILY
Qty: 28 CAPSULE | Refills: 0 | Status: SHIPPED | OUTPATIENT
Start: 2020-08-14 | End: 2020-08-28

## 2020-08-14 RX ORDER — ERGOCALCIFEROL 1.25 MG/1
50000 CAPSULE ORAL WEEKLY
Qty: 12 CAPSULE | Refills: 0 | Status: SHIPPED | OUTPATIENT
Start: 2020-08-14 | End: 2020-11-12

## 2020-08-14 NOTE — TELEPHONE ENCOUNTER
LVM on [phone].  Attempted to reach pt at wk #.  Portal message sent informing pt Rx's sent to his pharmacy on file, repeat lab scheduled on [facility] for 8/19, and to let us know if anything needs to be rescheduled or with any questions.

## 2020-08-14 NOTE — TELEPHONE ENCOUNTER
----- Message from Ene Snyder sent at 8/14/2020 10:19 AM CDT -----  Pt is returning a missed call from you and would like for you to give him a call back

## 2020-08-19 ENCOUNTER — LAB VISIT (OUTPATIENT)
Dept: LAB | Facility: HOSPITAL | Age: 55
End: 2020-08-19
Attending: RADIOLOGY
Payer: COMMERCIAL

## 2020-08-19 DIAGNOSIS — R79.89 LOW VITAMIN D LEVEL: ICD-10-CM

## 2020-08-19 DIAGNOSIS — E87.6 LOW BLOOD POTASSIUM: ICD-10-CM

## 2020-08-19 PROCEDURE — 82306 VITAMIN D 25 HYDROXY: CPT

## 2020-08-19 PROCEDURE — 84132 ASSAY OF SERUM POTASSIUM: CPT

## 2020-08-19 PROCEDURE — 36415 COLL VENOUS BLD VENIPUNCTURE: CPT | Mod: PN

## 2020-08-20 LAB
25(OH)D3+25(OH)D2 SERPL-MCNC: 31 NG/ML (ref 30–96)
POTASSIUM SERPL-SCNC: 3.8 MMOL/L (ref 3.5–5.1)

## 2020-08-24 ENCOUNTER — OFFICE VISIT (OUTPATIENT)
Dept: CARDIOLOGY | Facility: CLINIC | Age: 55
End: 2020-08-24
Payer: COMMERCIAL

## 2020-08-24 VITALS
OXYGEN SATURATION: 95 % | SYSTOLIC BLOOD PRESSURE: 144 MMHG | WEIGHT: 303.56 LBS | HEART RATE: 75 BPM | BODY MASS INDEX: 37.74 KG/M2 | DIASTOLIC BLOOD PRESSURE: 82 MMHG | HEIGHT: 75 IN

## 2020-08-24 DIAGNOSIS — I25.10 CORONARY ARTERY DISEASE, ANGINA PRESENCE UNSPECIFIED, UNSPECIFIED VESSEL OR LESION TYPE, UNSPECIFIED WHETHER NATIVE OR TRANSPLANTED HEART: ICD-10-CM

## 2020-08-24 DIAGNOSIS — I10 ESSENTIAL HYPERTENSION: ICD-10-CM

## 2020-08-24 DIAGNOSIS — Z01.810 PREOP CARDIOVASCULAR EXAM: Primary | ICD-10-CM

## 2020-08-24 DIAGNOSIS — E66.01 MORBID OBESITY: ICD-10-CM

## 2020-08-24 DIAGNOSIS — I25.118 CORONARY ARTERY DISEASE OF NATIVE ARTERY OF NATIVE HEART WITH STABLE ANGINA PECTORIS: ICD-10-CM

## 2020-08-24 DIAGNOSIS — I10 HYPERTENSION, UNSPECIFIED TYPE: ICD-10-CM

## 2020-08-24 DIAGNOSIS — I25.2 HISTORY OF MI (MYOCARDIAL INFARCTION): ICD-10-CM

## 2020-08-24 PROCEDURE — 99999 PR PBB SHADOW E&M-EST. PATIENT-LVL IV: CPT | Mod: PBBFAC,,, | Performed by: INTERNAL MEDICINE

## 2020-08-24 PROCEDURE — 99999 PR PBB SHADOW E&M-EST. PATIENT-LVL IV: ICD-10-PCS | Mod: PBBFAC,,, | Performed by: INTERNAL MEDICINE

## 2020-08-24 PROCEDURE — 99245 OFF/OP CONSLTJ NEW/EST HI 55: CPT | Mod: S$GLB,,, | Performed by: INTERNAL MEDICINE

## 2020-08-24 PROCEDURE — 99245 PR OFFICE CONSULTATION,LEVEL V: ICD-10-PCS | Mod: S$GLB,,, | Performed by: INTERNAL MEDICINE

## 2020-08-24 RX ORDER — ASPIRIN 81 MG/1
81 TABLET ORAL DAILY
Qty: 30 TABLET | Refills: 0 | Status: SHIPPED | OUTPATIENT
Start: 2020-08-24 | End: 2021-08-24

## 2020-08-24 NOTE — PROGRESS NOTES
Subjective:   Patient ID:  Cb Hernandez is a 55 y.o. male who presents for cardiac consult of Pre-op Exam    Referring by: Faith Rudolph PA-C  Reason for consult: preop CV eval    HPI  The patient came in today for cardiac consult of Pre-op Exam    8/24/20  Cb Hernandez is a 55 y.o. male pt with CAD s/p MI with PCI LAD LARISSA Jan 2020, HTN, HLD, morbid obesity, anxiety, depression presents for pre op CV eval.     Pt will undergo bariatric surgery and is here for preop testing. He has trouble losing weight, joint pain unable. He sees Dr. Durga Waddell with [facility].   He saw him in Feb. He was on asa, plavix and taken off aspirin. He had 2 prior stents, once in 2016 in [place].     Patient feels  no chest pain, no sob, no leg swelling, no PND, no palpitation, no dizziness, no syncope, no CNS symptoms.    Patient has fairly good exercise tolerance. [employer] for [employer].     Patient is compliant with medications.    Normal sinus rhythm   Possible Left atrial enlargement   Possible Anterior infarct ,age undetermined   Abnormal ECG   No previous ECGs available   Confirmed by MINE QUIJANO MD ([id number]) on 8/12/2020 11:22:03 PM     Past Medical History:   Diagnosis Date    Anxiety     Arthritis     Cancer     Coronary artery disease     Depression 8/4/2020    Hyperlipidemia     Hypertension        Past Surgical History:   Procedure Laterality Date    ELBOW SURGERY Bilateral     KNEE SURGERY Bilateral     PROSTATE SURGERY      SHOULDER SURGERY Bilateral        Social History     Tobacco Use    Smoking status: Never Smoker    Smokeless tobacco: Never Used   Substance Use Topics    Alcohol use: Yes     Comment: socially    Drug use: Never       Family History   Problem Relation Age of Onset    Diabetes Mother     No Known Problems Father     No Known Problems Sister     No Known Problems Brother     No Known Problems Daughter     No Known Problems Son     No Known Problems Brother     No Known Problems  Son        Patient's Medications   New Prescriptions    ASPIRIN (ECOTRIN) 81 MG EC TABLET    Take 1 tablet (81 mg total) by mouth once daily.   Previous Medications    AMOXICILLIN (AMOXIL) 500 MG TAB    Take 2 tablets (1,000 mg total) by mouth every 12 (twelve) hours. Please take with other medications: Biaxin and Omeprazole for 14 days    ATORVASTATIN (LIPITOR) 40 MG TABLET    1 tablet    BUPROPION (WELLBUTRIN XL) 150 MG TB24 TABLET    1 tablet in the morning    CARVEDILOL (COREG) 25 MG TABLET    2 tablet    CLARITHROMYCIN (BIAXIN) 500 MG TABLET    Take 1 tablet (500 mg total) by mouth every 12 (twelve) hours. Please take with other medications: Amoxicillin and Omeprazole for 14 days    CLOPIDOGREL (PLAVIX) 75 MG TABLET    1 tablet    ERGOCALCIFEROL (VITAMIN D2) 50,000 UNIT CAP    Take 1 capsule (50,000 Units total) by mouth once a week.    ESCITALOPRAM OXALATE (LEXAPRO) 20 MG TABLET    1 tablet    HYDRALAZINE (APRESOLINE) 25 MG TABLET    1 tablet    OLMESARTAN (BENICAR) 40 MG TABLET    1 tablet    OMEPRAZOLE (PRILOSEC) 20 MG CAPSULE    Take 1 capsule (20 mg total) by mouth 2 (two) times daily. Take with amoxil and biaxin for 14 days    TRIAMTERENE-HYDROCHLOROTHIAZIDE 37.5-25 MG (MAXZIDE-25) 37.5-25 MG PER TABLET    1 tablet in the morning   Modified Medications    No medications on file   Discontinued Medications    No medications on file       Review of Systems   Constitutional: Negative.    HENT: Negative.    Eyes: Negative.    Respiratory: Negative.    Cardiovascular: Negative.    Gastrointestinal: Negative.    Genitourinary: Negative.    Musculoskeletal: Negative.    Skin: Negative.    Neurological: Negative.    Endo/Heme/Allergies: Negative.    Psychiatric/Behavioral: Negative.    All 12 systems otherwise negative.      Wt Readings from Last 3 Encounters:   08/24/20 (!) 137.7 kg (303 lb 9.2 oz)   08/04/20 (!) 138.3 kg (305 lb)     Temp Readings from Last 3 Encounters:   No data found for Temp     BP Readings  "from Last 3 Encounters:   08/24/20 (!) 144/82     Pulse Readings from Last 3 Encounters:   08/24/20 75       BP (!) 144/82 (BP Location: Right arm, Patient Position: Sitting, BP Method: Large (Manual))   Pulse 75   Ht 6' 3" (1.905 m)   Wt (!) 137.7 kg (303 lb 9.2 oz)   SpO2 95%   BMI 37.94 kg/m²     Objective:   Physical Exam   Constitutional: He is oriented to person, place, and time. He appears well-developed and well-nourished. No distress.   Obese   HENT:   Head: Normocephalic and atraumatic.   Nose: Nose normal.   Mouth/Throat: Oropharynx is clear and moist.   Eyes: Conjunctivae and EOM are normal. No scleral icterus.   Neck: Normal range of motion. Neck supple. No JVD present. No thyromegaly present.   Cardiovascular: Normal rate, regular rhythm, S1 normal and S2 normal. Exam reveals no gallop, no S3, no S4 and no friction rub.   No murmur heard.  Pulmonary/Chest: Effort normal and breath sounds normal. No stridor. No respiratory distress. He has no wheezes. He has no rales. He exhibits no tenderness.   Abdominal: Soft. Bowel sounds are normal. He exhibits no distension and no mass. There is no abdominal tenderness. There is no rebound.   Genitourinary:    Genitourinary Comments: Deferred     Musculoskeletal: Normal range of motion.         General: No tenderness, deformity or edema.   Lymphadenopathy:     He has no cervical adenopathy.   Neurological: He is alert and oriented to person, place, and time. He exhibits normal muscle tone. Coordination normal.   Skin: Skin is warm and dry. No rash noted. He is not diaphoretic. No erythema. No pallor.   Psychiatric: He has a normal mood and affect. His behavior is normal. Judgment and thought content normal.   Nursing note and vitals reviewed.      Lab Results   Component Value Date     08/12/2020    K 3.8 08/19/2020     08/12/2020    CO2 29 08/12/2020    BUN 21 (H) 08/12/2020    CREATININE 1.4 08/12/2020    GLU 91 08/12/2020    HGBA1C 5.6 " 08/12/2020    MG 1.9 08/12/2020    AST 31 08/12/2020    ALT 60 (H) 08/12/2020    ALBUMIN 3.5 08/12/2020    PROT 7.1 08/12/2020    BILITOT 0.5 08/12/2020    WBC 9.51 08/12/2020    HGB 13.9 (L) 08/12/2020    HCT 42.2 08/12/2020    MCV 91 08/12/2020     08/12/2020    TSH 1.223 08/12/2020    CHOL 109 (L) 08/12/2020    HDL 49 08/12/2020    LDLCALC 46.4 (L) 08/12/2020    TRIG 68 08/12/2020     Assessment:      1. Preop cardiovascular exam    2. Hypertension, unspecified type    3. Coronary artery disease, angina presence unspecified, unspecified vessel or lesion type, unspecified whether native or transplanted heart    4. History of MI (myocardial infarction)    5. Essential hypertension    6. Morbid obesity    7. BMI 38.0-38.9,adult    8. Coronary artery disease of native artery of native heart with stable angina pectoris        Plan:   1. CAD s/p PCI in Jan 2020, ? LARISSA vs BMS  - cont asa, statin, BB, plavix  - stable     2. HTN  - cont meds    3. HLD  - cont statin    4. Obesity  - cont weight loss and surgical eval    5. Preop CV eval prior to bariatric surgery  - order pharm nuclear stress test  - 2D ECHO  - if negative can proceed to surgery as low cardiac risk; will discuss if pt can stop DAPT, prefer to remain on DAPT 1 year post PCI    ADDENDUM - Prox LAD LARISSA stent placed 1/16/2020 - would need to discus with interventional cardiologist regarding stopping DAPT.     Thank you for allowing me to participate in this patient's care. Please do not hesitate to contact me with any questions or concerns. Consult note has been forwarded to the referral physician.

## 2020-08-24 NOTE — LETTER
August 24, 2020      Faith Rudolph PA-C  [address]  [address] Floor, [facility] [place] [address]           [facility]  [address]  [place] [address]  Phone: [phone]  Fax: [phone]          Patient: Cb Hernandez   MR Number: [id number]   YOB: 1965   Date of Visit: 8/24/2020       Dear Faith Rudolph:    Thank you for referring Cb Hernandez to me for evaluation. Attached you will find relevant portions of my assessment and plan of care.    If you have questions, please do not hesitate to call me. I look forward to following Cb Hernandez along with you.    Sincerely,    Sree Bolanos MD    Enclosure  CC:  No Recipients    If you would like to receive this communication electronically, please contact [email or web address] or [phone] to request more information on [employer] Link access.    For providers and/or their staff who would like to refer a patient to [facility], please contact us through our one-stop-shop provider referral line, [facility], at [phone].    If you feel you have received this communication in error or would no longer like to receive these types of communications, please e-mail [email or web address]

## 2020-08-26 ENCOUNTER — HOSPITAL ENCOUNTER (OUTPATIENT)
Dept: RADIOLOGY | Facility: HOSPITAL | Age: 55
Discharge: HOME | End: 2020-08-26
Attending: INTERNAL MEDICINE
Payer: COMMERCIAL

## 2020-08-26 ENCOUNTER — HOSPITAL ENCOUNTER (OUTPATIENT)
Dept: CARDIOLOGY | Facility: HOSPITAL | Age: 55
Discharge: HOME | End: 2020-08-26
Attending: INTERNAL MEDICINE
Payer: COMMERCIAL

## 2020-08-26 DIAGNOSIS — I10 ESSENTIAL HYPERTENSION: ICD-10-CM

## 2020-08-26 DIAGNOSIS — I25.2 HISTORY OF MI (MYOCARDIAL INFARCTION): ICD-10-CM

## 2020-08-26 DIAGNOSIS — E66.01 MORBID OBESITY: ICD-10-CM

## 2020-08-26 DIAGNOSIS — Z01.810 PREOP CARDIOVASCULAR EXAM: ICD-10-CM

## 2020-08-26 DIAGNOSIS — I10 HYPERTENSION, UNSPECIFIED TYPE: ICD-10-CM

## 2020-08-26 DIAGNOSIS — I25.10 CORONARY ARTERY DISEASE, ANGINA PRESENCE UNSPECIFIED, UNSPECIFIED VESSEL OR LESION TYPE, UNSPECIFIED WHETHER NATIVE OR TRANSPLANTED HEART: ICD-10-CM

## 2020-08-26 LAB
CV STRESS BASE HR: 66 BPM
DIASTOLIC BLOOD PRESSURE: 78 MMHG
NUC REST EJECTION FRACTION: 58
NUC STRESS EJECTION FRACTION: 57 %
OHS CV CPX 85 PERCENT MAX PREDICTED HEART RATE MALE: 139
OHS CV CPX MAX PREDICTED HEART RATE: 163
OHS CV CPX PATIENT IS FEMALE: 0
OHS CV CPX PATIENT IS MALE: 1
OHS CV CPX PEAK DIASTOLIC BLOOD PRESSURE: 87 MMHG
OHS CV CPX PEAK HEAR RATE: 87 BPM
OHS CV CPX PEAK RATE PRESSURE PRODUCT: NORMAL
OHS CV CPX PEAK SYSTOLIC BLOOD PRESSURE: 179 MMHG
OHS CV CPX PERCENT MAX PREDICTED HEART RATE ACHIEVED: 53
OHS CV CPX RATE PRESSURE PRODUCT PRESENTING: NORMAL
STRESS ECHO POST EXERCISE DUR MIN: 1 MINUTES
STRESS ECHO POST EXERCISE DUR SEC: 10 SECONDS
SYSTOLIC BLOOD PRESSURE: 171 MMHG

## 2020-08-26 PROCEDURE — 93016 STRESS TEST WITH MYOCARDIAL PERFUSION (CUPID ONLY): ICD-10-PCS | Mod: ,,, | Performed by: INTERNAL MEDICINE

## 2020-08-26 PROCEDURE — 93017 CV STRESS TEST TRACING ONLY: CPT

## 2020-08-26 PROCEDURE — A9502 TC99M TETROFOSMIN: HCPCS

## 2020-08-26 PROCEDURE — 93018 CV STRESS TEST I&R ONLY: CPT | Mod: ,,, | Performed by: INTERNAL MEDICINE

## 2020-08-26 PROCEDURE — 93016 CV STRESS TEST SUPVJ ONLY: CPT | Mod: ,,, | Performed by: INTERNAL MEDICINE

## 2020-08-26 PROCEDURE — 78452 HT MUSCLE IMAGE SPECT MULT: CPT | Mod: 26,,, | Performed by: INTERNAL MEDICINE

## 2020-08-26 PROCEDURE — 93018 STRESS TEST WITH MYOCARDIAL PERFUSION (CUPID ONLY): ICD-10-PCS | Mod: ,,, | Performed by: INTERNAL MEDICINE

## 2020-08-26 PROCEDURE — 78452 STRESS TEST WITH MYOCARDIAL PERFUSION (CUPID ONLY): ICD-10-PCS | Mod: 26,,, | Performed by: INTERNAL MEDICINE

## 2020-08-26 RX ORDER — REGADENOSON 0.08 MG/ML
0.4 INJECTION, SOLUTION INTRAVENOUS ONCE
Status: COMPLETED | OUTPATIENT
Start: 2020-08-26 | End: 2020-08-26

## 2020-08-26 RX ADMIN — REGADENOSON 0.4 MG: 0.08 INJECTION, SOLUTION INTRAVENOUS at 09:08

## 2020-08-27 ENCOUNTER — HOSPITAL ENCOUNTER (OUTPATIENT)
Dept: CARDIOLOGY | Facility: HOSPITAL | Age: 55
Discharge: HOME | End: 2020-08-27
Attending: INTERNAL MEDICINE
Payer: COMMERCIAL

## 2020-08-27 VITALS
DIASTOLIC BLOOD PRESSURE: 82 MMHG | HEIGHT: 75 IN | SYSTOLIC BLOOD PRESSURE: 144 MMHG | BODY MASS INDEX: 37.67 KG/M2 | WEIGHT: 303 LBS

## 2020-08-27 DIAGNOSIS — I10 HYPERTENSION, UNSPECIFIED TYPE: ICD-10-CM

## 2020-08-27 DIAGNOSIS — Z01.810 PREOP CARDIOVASCULAR EXAM: ICD-10-CM

## 2020-08-27 DIAGNOSIS — E66.01 MORBID OBESITY: ICD-10-CM

## 2020-08-27 DIAGNOSIS — I25.2 HISTORY OF MI (MYOCARDIAL INFARCTION): ICD-10-CM

## 2020-08-27 DIAGNOSIS — I25.10 CORONARY ARTERY DISEASE, ANGINA PRESENCE UNSPECIFIED, UNSPECIFIED VESSEL OR LESION TYPE, UNSPECIFIED WHETHER NATIVE OR TRANSPLANTED HEART: ICD-10-CM

## 2020-08-27 DIAGNOSIS — I10 ESSENTIAL HYPERTENSION: ICD-10-CM

## 2020-08-27 PROCEDURE — 93306 TTE W/DOPPLER COMPLETE: CPT

## 2020-08-27 PROCEDURE — 93306 TTE W/DOPPLER COMPLETE: CPT | Mod: 26,,, | Performed by: INTERNAL MEDICINE

## 2020-08-27 PROCEDURE — 93306 ECHO (CUPID ONLY): ICD-10-PCS | Mod: 26,,, | Performed by: INTERNAL MEDICINE

## 2020-08-28 LAB
AORTIC ROOT ANNULUS: 3.11 CM
ASCENDING AORTA: 3.19 CM
AV INDEX (PROSTH): 0.9
AV MEAN GRADIENT: 5 MMHG
AV PEAK GRADIENT: 9 MMHG
AV VALVE AREA: 3.53 CM2
AV VELOCITY RATIO: 0.88
BSA FOR ECHO PROCEDURE: 2.7 M2
CV ECHO LV RWT: 0.56 CM
DOP CALC AO PEAK VEL: 1.52 M/S
DOP CALC AO VTI: 24.83 CM
DOP CALC LVOT AREA: 3.9 CM2
DOP CALC LVOT DIAMETER: 2.24 CM
DOP CALC LVOT PEAK VEL: 1.33 M/S
DOP CALC LVOT STROKE VOLUME: 87.76 CM3
DOP CALC RVOT PEAK VEL: 0.83 M/S
DOP CALC RVOT VTI: 14.97 CM
DOP CALCLVOT PEAK VEL VTI: 22.28 CM
E WAVE DECELERATION TIME: 205.74 MSEC
E/A RATIO: 0.78
E/E' RATIO: 13.64 M/S
ECHO LV POSTERIOR WALL: 1.38 CM (ref 0.6–1.1)
FRACTIONAL SHORTENING: 39 % (ref 28–44)
INTERVENTRICULAR SEPTUM: 1.42 CM (ref 0.6–1.1)
IVRT: 77.07 MSEC
LA MAJOR: 5.81 CM
LA MINOR: 5.16 CM
LA WIDTH: 3.52 CM
LEFT ATRIUM SIZE: 3.74 CM
LEFT ATRIUM VOLUME INDEX: 23.4 ML/M2
LEFT ATRIUM VOLUME: 61.16 CM3
LEFT INTERNAL DIMENSION IN SYSTOLE: 3 CM (ref 2.1–4)
LEFT VENTRICLE DIASTOLIC VOLUME INDEX: 43.38 ML/M2
LEFT VENTRICLE DIASTOLIC VOLUME: 113.58 ML
LEFT VENTRICLE MASS INDEX: 108 G/M2
LEFT VENTRICLE SYSTOLIC VOLUME INDEX: 13.3 ML/M2
LEFT VENTRICLE SYSTOLIC VOLUME: 34.89 ML
LEFT VENTRICULAR INTERNAL DIMENSION IN DIASTOLE: 4.91 CM (ref 3.5–6)
LEFT VENTRICULAR MASS: 283.43 G
LV LATERAL E/E' RATIO: 12.5 M/S
LV SEPTAL E/E' RATIO: 15 M/S
MV PEAK A VEL: 0.96 M/S
MV PEAK E VEL: 0.75 M/S
MV STENOSIS PRESSURE HALF TIME: 59.66 MS
MV VALVE AREA P 1/2 METHOD: 3.69 CM2
PULM VEIN S/D RATIO: 1.2
PV MEAN GRADIENT: 1.65 MMHG
PV PEAK D VEL: 0.55 M/S
PV PEAK GRADIENT: 2.75 MMHG
PV PEAK S VEL: 0.66 M/S
PV PEAK VELOCITY: 1.12 CM/S
RA MAJOR: 4.16 CM
RA WIDTH: 2.94 CM
RIGHT VENTRICULAR END-DIASTOLIC DIMENSION: 2.68 CM
SINUS: 3.15 CM
STJ: 2.54 CM
TDI LATERAL: 0.06 M/S
TDI SEPTAL: 0.05 M/S
TDI: 0.06 M/S

## 2020-09-02 ENCOUNTER — PATIENT MESSAGE (OUTPATIENT)
Dept: BARIATRICS | Facility: CLINIC | Age: 55
End: 2020-09-02

## 2020-09-11 ENCOUNTER — CLINICAL SUPPORT (OUTPATIENT)
Dept: BARIATRICS | Facility: CLINIC | Age: 55
End: 2020-09-11
Payer: COMMERCIAL

## 2020-09-11 DIAGNOSIS — I10 ESSENTIAL HYPERTENSION: ICD-10-CM

## 2020-09-11 DIAGNOSIS — Z71.3 DIETARY COUNSELING: ICD-10-CM

## 2020-09-11 DIAGNOSIS — E66.9 OBESITY (BMI 30-39.9): ICD-10-CM

## 2020-09-11 PROCEDURE — 99499 NO LOS: ICD-10-PCS | Mod: 95,,, | Performed by: DIETITIAN, REGISTERED

## 2020-09-11 PROCEDURE — 97803 MED NUTRITION INDIV SUBSEQ: CPT | Mod: 95,,, | Performed by: DIETITIAN, REGISTERED

## 2020-09-11 PROCEDURE — 99499 UNLISTED E&M SERVICE: CPT | Mod: 95,,, | Performed by: DIETITIAN, REGISTERED

## 2020-09-11 PROCEDURE — 97803 PR MED NUTR THER, SUBSQ, INDIV, EA 15 MIN: ICD-10-PCS | Mod: 95,,, | Performed by: DIETITIAN, REGISTERED

## 2020-09-11 NOTE — PROGRESS NOTES
The patient location is: home ([place])  The chief complaint leading to consultation is: obesity    Visit type: audiovisual    Face to Face time with patient: 15 min  15 minutes of total time spent on the encounter, which includes face to face time and non-face to face time preparing to see the patient (eg, review of tests), Obtaining and/or reviewing separately obtained history, Documenting clinical information in the electronic or other health record, Independently interpreting results (not separately reported) and communicating results to the patient/family/caregiver, or Care coordination (not separately reported).         Each patient to whom he or she provides medical services by telemedicine is:  (1) informed of the relationship between the physician and patient and the respective role of any other health care provider with respect to management of the patient; and (2) notified that he or she may decline to receive medical services by telemedicine and may withdraw from such care at any time.    NUTRITION NOTE    Referring Physician: Ze Canales M.D.  Reason for MNT Referral: Medically Supervised Diet pending Gastric Sleeve    Patient presents for f/u visit for MSD with 5 lbs weight loss over the past month by making numerous dietary and lifestyle changes in preparation for surgery.    CLINICAL DATA:  55 y.o. male. 6 ft. 3 in.    Current Weight: 300 lbs  Weight Change Since Initial Visit: - 5 lbs  Ideal Body Weight: 182 lbs  BMI: 37    CURRENT DIET:  Reduced-calorie diet.  Diet Recall: Food records are not present    B: 2 eggs, 1 slice ww toast dry, banana, orange  L: salad with grilled chicken and vinaigrette OR SK strawberry Gladiator  - apple, orange  D 6pm: baked chicken, corn    Diet Includes:   Meal Pattern: 3 meals + 3 snacks (fruit)  Protein Supplements: 1 every other day.  Snacking: Adequate. Snacks include healthy choices.  Vegetables: Likes a variety. Eats almost daily.  Fruits: Likes a variety.  Eats daily.  Beverages: water and sugar-free beverages  Dining Out:  Weekly. Mostly fast food, restaurants and take-out.  Cooking at home: Daily. Weekly. Mostly baked, grilled and smothered meat, fish and vegetables. Very limited starches.    CURRENT EXERCISE:  Chair exercises (squats, side/front/back leg lifts)  Increased walking around.    Has gym membership with stationary bike but has not been going d/t covid. No pool access. No gym equipment at home. He tries to go for walks occ but is very limited.     Restrictions to exercise: knees and lower back pain, arthritis    Vitamins / Minerals / Herbs:  MV 50+      Labs:   none available at this time     Food Allergies:   None known     Social:  Works regular daytime shifts. Mostly seated.  Lives alone.  Grocery shopping and food prep done by the pt.  Patient unsure who will be supportive after surgery. He plans to take an Uber.  Alcohol: Socially.  Smoking: None.    ASSESSMENT:  Patient demonstrates willingness to change lifestyle habits as evidenced by weight loss, good exercise, dietary changes and including protein drinks.    Doing well with working on greatest challenges (starchy CHO).    Barriers to Education:  none  Stage of Change:  action    NUTRITION DIAGNOSIS:  Obesity related to Excessive carbohydrate intake and Physical inactivity as evidence by BMI.  Status: Improved     RECOMMENDATION:  Good candidate for bariatric surgery - Sleeve.    PLAN:    Diet: Maintain diet plan.    Exercise: Maintain.    Behavior Modification: Continue to document food & activity logs daily.      Follow up before/after surgery as needed    Communicated nutrition plan with bariatric team.    SESSION TIME:  15 minutes

## 2020-09-22 ENCOUNTER — PATIENT MESSAGE (OUTPATIENT)
Dept: BARIATRICS | Facility: CLINIC | Age: 55
End: 2020-09-22

## 2020-09-22 ENCOUNTER — TELEPHONE (OUTPATIENT)
Dept: BARIATRICS | Facility: CLINIC | Age: 55
End: 2020-09-22

## 2020-09-22 NOTE — TELEPHONE ENCOUNTER
----- Message from Marlene Bender RN sent at 9/22/2020  3:37 PM CDT -----  Pt is calling to see if we know any information regarding the 12 msd requirements for his [employer].  He has had 3 visits with Griselda and another scheduled.  He also sent some gym records from 2018, they are in media. I updated his workup in the comment section of the snapshot.   Thank you,  Marlene

## 2020-09-22 NOTE — TELEPHONE ENCOUNTER
Called patient to discuss requirements from insurance regarding 12 sessions.  He stated that the  from [facility] said that he could use his sessions from the gym (scanned into media tab).  Patient stated all work up is complete except the nutrition clearance.  Notified patient that I would message Griselda Curiel, dietitian, to see if he was cleared or if he still needed to make additional changes/wt loss to get cleared.  Reviewed the authorization process with patient once cleared.  Patient wishes to use O/S records in conjuction with his dietary visits to meet insurance requirements.

## 2020-10-01 ENCOUNTER — PATIENT MESSAGE (OUTPATIENT)
Dept: BARIATRICS | Facility: CLINIC | Age: 55
End: 2020-10-01

## 2020-10-02 ENCOUNTER — PATIENT MESSAGE (OUTPATIENT)
Dept: BARIATRICS | Facility: CLINIC | Age: 55
End: 2020-10-02

## 2020-10-14 ENCOUNTER — PATIENT MESSAGE (OUTPATIENT)
Dept: BARIATRICS | Facility: CLINIC | Age: 55
End: 2020-10-14

## 2020-10-16 ENCOUNTER — TELEPHONE (OUTPATIENT)
Dept: BARIATRICS | Facility: CLINIC | Age: 55
End: 2020-10-16

## 2020-10-16 NOTE — TELEPHONE ENCOUNTER
Spoke to LACI Gonzalez at interventional cardiologist office and she is a hospitalist and only prescribed 30 days of his Plavix. Found out that Dr Bairon Calixto is his PCP and is the one now prescribing the Plavix with the ASA.  He is not in [facility] either so will have to call his office to obtain parameters for stopping the Plavix and when to resume safely after surgery.  Phoned Dr Bairon Calixto's office and spoke to Sheri.  Will fax a cardiology clearance letter along with testing results from testing done here, for him to sign and put his recommendations for holding and resuming DAPT, ASA and Plavix.

## 2020-10-21 ENCOUNTER — DOCUMENTATION ONLY (OUTPATIENT)
Dept: BARIATRICS | Facility: CLINIC | Age: 55
End: 2020-10-21

## 2020-10-21 NOTE — PROGRESS NOTES
Sent message to Rodriguez Rosario to put patient on selection committee due to questions surrounding his stent placement and the timing of his surgery.

## 2020-10-26 ENCOUNTER — PATIENT MESSAGE (OUTPATIENT)
Dept: BARIATRICS | Facility: CLINIC | Age: 55
End: 2020-10-26

## 2020-10-27 ENCOUNTER — TELEPHONE (OUTPATIENT)
Dept: BARIATRICS | Facility: CLINIC | Age: 55
End: 2020-10-27

## 2020-10-27 NOTE — TELEPHONE ENCOUNTER
Staff message from Dr Yessenia Bolanos regarding his input on holding the DAPT for pending bariatric surgery and cardiac clearance,  He gave clearance based on cardiac studies but does not want to render an opinion on the Plavix and ASA.

## 2020-10-27 NOTE — TELEPHONE ENCOUNTER
Phoned patient yesterday at [id number] and had lengthy conversation regarding cardiac clearance and having his bariatric surgery with Dr Canales.  Informed him of the documentation received from Dr Bairon Calixto regarding waiting at least 1 year after stent placement to have his surgery due to not wanting him to be off of the DAPT for at least 1 year.  Patient became very upset about this and said he does not even see Dr Bairon Calixto in [place] and that his new cardiologist Dr Bolanos, cleared him.  He has another appointment with Dr Bolanos on December 2 for follow up.  Encouraged him to move that appointment up earlier to get his further input.  Informed patient that I would discuss it with Dr Canales and let him know the outcome.  Patient verbalized understanding.

## 2020-10-27 NOTE — TELEPHONE ENCOUNTER
----- Message from Sree Bolanos MD sent at 10/16/2020  1:58 PM CDT -----  Regarding: RE: cardiac clearnce for gatric sleeve surgery  Normal nuclear stress test but want input from interventional cardiologist that placed stent regarding stopping aspirin or plavix.     I am not sure what type of stent, which generation and what duration of the asa/plavix     Please request a cardiac eval from Dr. Durga Waddell with [facility] and tell him his stress and ECHO are normal. Thanks  - Dr. Bolanos  ----- Message -----  From: Kenisha Miranda RN  Sent: 10/16/2020   1:23 PM CDT  To: Sree Bolanos MD, Kenisha Miranda RN, #  Subject: cardiac clearnce for gatric sleeve surgery       Hi Dr Bolanos,  You saw this patient on 8/24/2020 for cardiac clearance for surgery.  You ordered a nuclear stress test and echo, both of which the patient has had.  Can you please put in a documentation note or addend your note with a new date stating after the above test, the patient is cleared to proceed with the gastric sleeve surgery.? I did see your note regarding DAPT and I will reach out to Dr Leon Tsai to get instructions for holding the plavix and asa.    Thanks so much for your help,  [place]

## 2020-10-28 ENCOUNTER — PATIENT MESSAGE (OUTPATIENT)
Dept: BARIATRICS | Facility: CLINIC | Age: 55
End: 2020-10-28

## 2020-10-28 ENCOUNTER — TELEPHONE (OUTPATIENT)
Dept: BARIATRICS | Facility: CLINIC | Age: 55
End: 2020-10-28

## 2020-10-28 NOTE — TELEPHONE ENCOUNTER
----- Message from Anurag Iverson sent at 10/28/2020  9:22 AM CDT -----  Regarding: Pt calling you back from yesterday        The Pt was returning your call from yesterday and would like for [place] to call him back please.    Phone # [phone]

## 2020-10-28 NOTE — TELEPHONE ENCOUNTER
Phoned patient and discussed receiving the revised cardiac clearance clinic visit from Dr Bairon Calixto.  Informed him that we scanned it into his record and we routed it to Dr Canales and that the decision is ultimately up to Dr Canales to decide if her wants to proceed with the gastric sleeve surgery in December, 11 months after the stent placement.  Also informed patient that Dr Bairon Calixto did not put any parameters for holding the plavix and ASA ? Number of days prior to surgery and when to resume after surgery.  Patient asked that we fax it back to him and request that he include those recommendations in an addended note.  Will fax back per patients request to [phone].

## 2020-10-29 ENCOUNTER — DOCUMENTATION ONLY (OUTPATIENT)
Dept: BARIATRICS | Facility: CLINIC | Age: 55
End: 2020-10-29

## 2020-10-30 ENCOUNTER — PATIENT MESSAGE (OUTPATIENT)
Dept: BARIATRICS | Facility: CLINIC | Age: 55
End: 2020-10-30

## 2020-11-03 ENCOUNTER — PATIENT MESSAGE (OUTPATIENT)
Dept: BARIATRICS | Facility: CLINIC | Age: 55
End: 2020-11-03

## 2020-11-03 DIAGNOSIS — E66.9 OBESITY, UNSPECIFIED CLASSIFICATION, UNSPECIFIED OBESITY TYPE, UNSPECIFIED WHETHER SERIOUS COMORBIDITY PRESENT: Primary | ICD-10-CM

## 2020-11-03 DIAGNOSIS — I25.10 CORONARY ARTERY DISEASE, ANGINA PRESENCE UNSPECIFIED, UNSPECIFIED VESSEL OR LESION TYPE, UNSPECIFIED WHETHER NATIVE OR TRANSPLANTED HEART: ICD-10-CM

## 2020-11-03 DIAGNOSIS — E66.9 OBESITY (BMI 30-39.9): ICD-10-CM

## 2020-11-03 DIAGNOSIS — I10 ESSENTIAL HYPERTENSION: ICD-10-CM

## 2020-11-03 DIAGNOSIS — I10 HYPERTENSION, UNSPECIFIED TYPE: ICD-10-CM

## 2020-11-03 DIAGNOSIS — E87.6 LOW SERUM POTASSIUM LEVEL: ICD-10-CM

## 2020-11-03 NOTE — PROGRESS NOTES
Per selection 10/2020: ok to proceed based on cardiology recommendation to stop Plavix at 11 months based on  model and patient understanding of risk

## 2020-11-04 ENCOUNTER — TELEPHONE (OUTPATIENT)
Dept: BARIATRICS | Facility: CLINIC | Age: 55
End: 2020-11-04

## 2020-11-04 NOTE — TELEPHONE ENCOUNTER
Spoke with patient and confirmed the surgical procedure of sleeve with Dr Canales.  Scheduled preop appts/ surgery date/2 week and 8 week post op appts . All dates and times agreed upon. Pt aware that if they are required to have PCP clearance, it must be within 60 days of surgery- it should be dated, signed and in chart for preop appointment. All medications have been reviewed regarding the necessity to be crushed or broken into pieces smaller that the tip of a pencil eraser for 2 weeks following gastric sleeve surgery and 4 weeks following gastric bypass surgery.  Pt instructed to stop taking all NSAIDS 1 week before surgery and for life after surgery.  Pt aware that protein liquid diet start date is 12/14/2020.  Screened patient for history of UTI per protocol.   Discussed with patient to avoid antibiotics and elective procedures involving sedation/anesthesia within 30 days of surgery unless cleared by the bariatric department.  Patient instructed to call the bariatric clinic post op for any s/s of UTI.  Patient's mailing address confirmed and informed to expect a manilla envelop containing bariatric surgery pre op booklet, appointment reminders, protein and fluid log sheets, and liquid diet and vitamin information sheets.  Pt aware that all appts can be seen in my [facility] patient portal at this time.  Confirmed email address and informed patient that they will be enrolled in the Patient Reported Outcomes program to track her progress and successes.  The first email will be sent 2-3 weeks before surgery and then every year on your surgery anniversary date.   Office phone and fax number given to patient for any future questions/concerns. Will schedule covid in [place].

## 2020-11-05 ENCOUNTER — PATIENT MESSAGE (OUTPATIENT)
Dept: BARIATRICS | Facility: CLINIC | Age: 55
End: 2020-11-05

## 2020-11-05 ENCOUNTER — TELEPHONE (OUTPATIENT)
Dept: BARIATRICS | Facility: CLINIC | Age: 55
End: 2020-11-05

## 2020-11-05 NOTE — TELEPHONE ENCOUNTER
Phoned pateint to update him on scheduling his surgery and answer any questions he may have.  Asked time of surgery on 12/28/2020 and informed him that we would call him probably on 12/23/2020 to tell him the arrival time and OR time.  He verbalized understanding.

## 2020-11-13 ENCOUNTER — TELEPHONE (OUTPATIENT)
Dept: BARIATRICS | Facility: CLINIC | Age: 55
End: 2020-11-13

## 2020-11-13 DIAGNOSIS — I25.10 CORONARY ARTERY DISEASE, ANGINA PRESENCE UNSPECIFIED, UNSPECIFIED VESSEL OR LESION TYPE, UNSPECIFIED WHETHER NATIVE OR TRANSPLANTED HEART: ICD-10-CM

## 2020-11-13 DIAGNOSIS — I10 HYPERTENSION, UNSPECIFIED TYPE: ICD-10-CM

## 2020-11-13 DIAGNOSIS — E66.9 OBESITY (BMI 30-39.9): Primary | ICD-10-CM

## 2020-11-13 DIAGNOSIS — E66.9 OBESITY, UNSPECIFIED CLASSIFICATION, UNSPECIFIED OBESITY TYPE, UNSPECIFIED WHETHER SERIOUS COMORBIDITY PRESENT: ICD-10-CM

## 2020-11-13 NOTE — TELEPHONE ENCOUNTER
----- Message from Bertha Pa sent at 11/13/2020  2:24 PM CST -----  Regarding: pt  Pt is calling to speak with the nurse Katarzyna again in ref to the pts sleep study can you please call pt at [phone].    PAULINA

## 2020-11-13 NOTE — TELEPHONE ENCOUNTER
LVM to [facility] regarding denial for gastric sleeve and with [place] score and appointment with sleep medicine MD.  Would like to know if there is a deadline to get the clinicals to overturn the denial.  Ref # [id number]

## 2020-11-13 NOTE — TELEPHONE ENCOUNTER
Phoned patient and discussed the appointment that he has next Thursday 11/19/2020.  Informed him that this is the evaluation and if the physician recommends a sleep study, we need to get that as well. Informed him that the clinic note of the eval will be in the chart to get to the insurance company for auth.

## 2020-11-13 NOTE — TELEPHONE ENCOUNTER
Phoned patient and informed him of the need for a sleep medicine referral and subsequent sleep study.  Ordered per Dr Canales for insurance approval for his surgery.he verbalized understanding and will get on it.  Scheduled appointment virtual with a physician at the [facility] for 11/19/2020.

## 2020-11-18 ENCOUNTER — TELEPHONE (OUTPATIENT)
Dept: BARIATRICS | Facility: CLINIC | Age: 55
End: 2020-11-18

## 2020-11-18 NOTE — TELEPHONE ENCOUNTER
Phoned patient and informed him that we received insurance approval for his surgery and that he would not need to see the sleep medicine physician unless he wanted to on his own.   He opted to cancel that appointment instead.

## 2020-11-25 ENCOUNTER — TELEPHONE (OUTPATIENT)
Dept: BARIATRICS | Facility: CLINIC | Age: 55
End: 2020-11-25

## 2020-11-25 NOTE — TELEPHONE ENCOUNTER
Phoned patient and discussed the need to change his surgery date.  Moved it from 12/28/2020 to 12/23/2020 and sent message to all necessary parties of the date change.

## 2020-12-09 ENCOUNTER — TELEPHONE (OUTPATIENT)
Dept: BARIATRICS | Facility: CLINIC | Age: 55
End: 2020-12-09

## 2020-12-09 NOTE — TELEPHONE ENCOUNTER
Pt will use unjury, gnc lean 25 protein shakes.  If using protein powder, reminded pt of mixing with water for days 1 and 2, by day 3 may try using skim, 1% milk or unsweetened almond/soy milk.  By Day 4, may use RTD protein shakes as tolerated  Pt has the following vitamins and minerals to start taking once discharged from hospital  Multivitamin with 18 mg iron take one tablet or chewable twice a day  B-complex with 50 mg thiamin taken once daily  Calcium citrate 500 mg with vitamin D three times per day  Vitamin B-12 500 mcg  Sublingually daily  Reviewed dosage and timing of vitamin/mineral guidelines.  Reviewed nutritional guidelines for protein and fluid requirements for week 1 and week 2 post-surgery.  Handout provided to log protein and fluid daily.  1 ounce medicine cups provided for patient to measure fluid intake after surgery.  Reviewed common nutritional concerns and prevention tips after bariatric surgery  Pt verbalized understanding of information provided with appropriate questions and comments.----- Message from Kenisha Miranda RN sent at 11/25/2020 10:12 AM CST -----  Regarding: ld  2 week liquid diet starts 12/9/2020  sleeve surgery scheduled 12/23/2020  preop appt scheduled 12/15/2020  Schedule virtual pre op.  Please place [employer] vitamin order.  Schedule 1 wk post op video visit for 12/30/2020

## 2020-12-15 ENCOUNTER — LAB VISIT (OUTPATIENT)
Dept: LAB | Facility: HOSPITAL | Age: 55
End: 2020-12-15
Payer: COMMERCIAL

## 2020-12-15 ENCOUNTER — PATIENT MESSAGE (OUTPATIENT)
Dept: BARIATRICS | Facility: CLINIC | Age: 55
End: 2020-12-15

## 2020-12-15 ENCOUNTER — OFFICE VISIT (OUTPATIENT)
Dept: BARIATRICS | Facility: CLINIC | Age: 55
End: 2020-12-15
Payer: COMMERCIAL

## 2020-12-15 VITALS
OXYGEN SATURATION: 99 % | HEART RATE: 70 BPM | WEIGHT: 300.94 LBS | SYSTOLIC BLOOD PRESSURE: 138 MMHG | BODY MASS INDEX: 37.61 KG/M2 | DIASTOLIC BLOOD PRESSURE: 76 MMHG

## 2020-12-15 DIAGNOSIS — E87.6 LOW SERUM POTASSIUM LEVEL: ICD-10-CM

## 2020-12-15 DIAGNOSIS — E87.6 LOW BLOOD POTASSIUM: Primary | ICD-10-CM

## 2020-12-15 DIAGNOSIS — I25.10 CORONARY ARTERY DISEASE, ANGINA PRESENCE UNSPECIFIED, UNSPECIFIED VESSEL OR LESION TYPE, UNSPECIFIED WHETHER NATIVE OR TRANSPLANTED HEART: ICD-10-CM

## 2020-12-15 DIAGNOSIS — I10 HYPERTENSION, UNSPECIFIED TYPE: ICD-10-CM

## 2020-12-15 DIAGNOSIS — I10 ESSENTIAL HYPERTENSION: ICD-10-CM

## 2020-12-15 DIAGNOSIS — E66.9 OBESITY (BMI 30-39.9): ICD-10-CM

## 2020-12-15 DIAGNOSIS — E66.9 OBESITY, UNSPECIFIED CLASSIFICATION, UNSPECIFIED OBESITY TYPE, UNSPECIFIED WHETHER SERIOUS COMORBIDITY PRESENT: ICD-10-CM

## 2020-12-15 DIAGNOSIS — Z09 POSTOP CHECK: ICD-10-CM

## 2020-12-15 LAB
ALBUMIN SERPL BCP-MCNC: 3.7 G/DL (ref 3.5–5.2)
ALP SERPL-CCNC: 64 U/L (ref 55–135)
ALT SERPL W/O P-5'-P-CCNC: 45 U/L (ref 10–44)
ANION GAP SERPL CALC-SCNC: 9 MMOL/L (ref 8–16)
AST SERPL-CCNC: 38 U/L (ref 10–40)
BASOPHILS # BLD AUTO: 0.05 K/UL (ref 0–0.2)
BASOPHILS NFR BLD: 0.7 % (ref 0–1.9)
BILIRUB SERPL-MCNC: 0.4 MG/DL (ref 0.1–1)
BUN SERPL-MCNC: 21 MG/DL (ref 6–20)
CALCIUM SERPL-MCNC: 9.4 MG/DL (ref 8.7–10.5)
CHLORIDE SERPL-SCNC: 101 MMOL/L (ref 95–110)
CO2 SERPL-SCNC: 33 MMOL/L (ref 23–29)
CREAT SERPL-MCNC: 1.4 MG/DL (ref 0.5–1.4)
DIFFERENTIAL METHOD: ABNORMAL
EOSINOPHIL # BLD AUTO: 0.2 K/UL (ref 0–0.5)
EOSINOPHIL NFR BLD: 3.1 % (ref 0–8)
ERYTHROCYTE [DISTWIDTH] IN BLOOD BY AUTOMATED COUNT: 15.4 % (ref 11.5–14.5)
EST. GFR  (AFRICAN AMERICAN): >60 ML/MIN/1.73 M^2
EST. GFR  (NON AFRICAN AMERICAN): 55.4 ML/MIN/1.73 M^2
GLUCOSE SERPL-MCNC: 89 MG/DL (ref 70–110)
HCT VFR BLD AUTO: 39.1 % (ref 40–54)
HGB BLD-MCNC: 13.2 G/DL (ref 14–18)
IMM GRANULOCYTES # BLD AUTO: 0.02 K/UL (ref 0–0.04)
IMM GRANULOCYTES NFR BLD AUTO: 0.3 % (ref 0–0.5)
LYMPHOCYTES # BLD AUTO: 1.6 K/UL (ref 1–4.8)
LYMPHOCYTES NFR BLD: 22.4 % (ref 18–48)
MCH RBC QN AUTO: 30.5 PG (ref 27–31)
MCHC RBC AUTO-ENTMCNC: 33.8 G/DL (ref 32–36)
MCV RBC AUTO: 90 FL (ref 82–98)
MONOCYTES # BLD AUTO: 1 K/UL (ref 0.3–1)
MONOCYTES NFR BLD: 13.9 % (ref 4–15)
NEUTROPHILS # BLD AUTO: 4.4 K/UL (ref 1.8–7.7)
NEUTROPHILS NFR BLD: 59.6 % (ref 38–73)
NRBC BLD-RTO: 0 /100 WBC
PLATELET # BLD AUTO: 225 K/UL (ref 150–350)
PMV BLD AUTO: 8.9 FL (ref 9.2–12.9)
POTASSIUM SERPL-SCNC: 3.3 MMOL/L (ref 3.5–5.1)
PROT SERPL-MCNC: 6.9 G/DL (ref 6–8.4)
RBC # BLD AUTO: 4.33 M/UL (ref 4.6–6.2)
SODIUM SERPL-SCNC: 143 MMOL/L (ref 136–145)
WBC # BLD AUTO: 7.33 K/UL (ref 3.9–12.7)

## 2020-12-15 PROCEDURE — 85025 COMPLETE CBC W/AUTO DIFF WBC: CPT

## 2020-12-15 PROCEDURE — 36415 COLL VENOUS BLD VENIPUNCTURE: CPT

## 2020-12-15 PROCEDURE — 99214 OFFICE O/P EST MOD 30 MIN: CPT | Mod: S$GLB,,, | Performed by: SURGERY

## 2020-12-15 PROCEDURE — 80053 COMPREHEN METABOLIC PANEL: CPT

## 2020-12-15 PROCEDURE — 99214 PR OFFICE/OUTPT VISIT, EST, LEVL IV, 30-39 MIN: ICD-10-PCS | Mod: S$GLB,,, | Performed by: SURGERY

## 2020-12-15 PROCEDURE — 99999 PR PBB SHADOW E&M-EST. PATIENT-LVL III: CPT | Mod: PBBFAC,,, | Performed by: SURGERY

## 2020-12-15 PROCEDURE — 99999 PR PBB SHADOW E&M-EST. PATIENT-LVL III: ICD-10-PCS | Mod: PBBFAC,,, | Performed by: SURGERY

## 2020-12-15 RX ORDER — ACETAMINOPHEN 650 MG/20.3ML
500 LIQUID ORAL EVERY 8 HOURS
Status: CANCELLED | OUTPATIENT
Start: 2020-12-15 | End: 2020-12-16

## 2020-12-15 RX ORDER — HYDROCODONE BITARTRATE AND ACETAMINOPHEN 7.5; 325 MG/15ML; MG/15ML
15 SOLUTION ORAL 4 TIMES DAILY PRN
Qty: 200 ML | Refills: 0 | Status: SHIPPED | OUTPATIENT
Start: 2020-12-15

## 2020-12-15 RX ORDER — METOCLOPRAMIDE HYDROCHLORIDE 5 MG/ML
10 INJECTION INTRAMUSCULAR; INTRAVENOUS ONCE
Status: CANCELLED | OUTPATIENT
Start: 2020-12-15 | End: 2020-12-15

## 2020-12-15 RX ORDER — ENOXAPARIN SODIUM 100 MG/ML
40 INJECTION SUBCUTANEOUS EVERY 24 HOURS
Status: CANCELLED | OUTPATIENT
Start: 2020-12-15

## 2020-12-15 RX ORDER — HYDROMORPHONE HYDROCHLORIDE 2 MG/ML
0.5 INJECTION, SOLUTION INTRAMUSCULAR; INTRAVENOUS; SUBCUTANEOUS
Status: CANCELLED | OUTPATIENT
Start: 2020-12-15

## 2020-12-15 RX ORDER — DEXTROMETHORPHAN/PSEUDOEPHED 2.5-7.5/.8
40 DROPS ORAL 4 TIMES DAILY PRN
Status: CANCELLED | OUTPATIENT
Start: 2020-12-15

## 2020-12-15 RX ORDER — HYDROCODONE BITARTRATE AND ACETAMINOPHEN 7.5; 325 MG/15ML; MG/15ML
15 SOLUTION ORAL EVERY 4 HOURS PRN
Status: CANCELLED | OUTPATIENT
Start: 2020-12-16

## 2020-12-15 RX ORDER — HEPARIN SODIUM 5000 [USP'U]/ML
5000 INJECTION, SOLUTION INTRAVENOUS; SUBCUTANEOUS ONCE
Status: CANCELLED | OUTPATIENT
Start: 2020-12-15 | End: 2020-12-15

## 2020-12-15 RX ORDER — GABAPENTIN 250 MG/5ML
300 SOLUTION ORAL 3 TIMES DAILY
Status: CANCELLED | OUTPATIENT
Start: 2020-12-15

## 2020-12-15 RX ORDER — SODIUM CHLORIDE 9 MG/ML
INJECTION, SOLUTION INTRAVENOUS CONTINUOUS
Status: CANCELLED | OUTPATIENT
Start: 2020-12-15

## 2020-12-15 RX ORDER — MUPIROCIN 20 MG/G
OINTMENT TOPICAL
Status: CANCELLED | OUTPATIENT
Start: 2020-12-15

## 2020-12-15 RX ORDER — OMEPRAZOLE 40 MG/1
40 CAPSULE, DELAYED RELEASE ORAL EVERY MORNING
Qty: 30 CAPSULE | Refills: 2 | Status: SHIPPED | OUTPATIENT
Start: 2020-12-15 | End: 2021-01-06 | Stop reason: SDUPTHER

## 2020-12-15 RX ORDER — ONDANSETRON 8 MG/1
8 TABLET, ORALLY DISINTEGRATING ORAL EVERY 6 HOURS PRN
Qty: 30 TABLET | Refills: 0 | Status: SHIPPED | OUTPATIENT
Start: 2020-12-15 | End: 2021-01-07 | Stop reason: SDUPTHER

## 2020-12-15 RX ORDER — POLYETHYLENE GLYCOL 3350 17 G/17G
17 POWDER, FOR SOLUTION ORAL DAILY
Qty: 510 G | Refills: 3 | Status: SHIPPED | OUTPATIENT
Start: 2020-12-15 | End: 2021-01-06 | Stop reason: SDUPTHER

## 2020-12-15 RX ORDER — SODIUM CHLORIDE, SODIUM LACTATE, POTASSIUM CHLORIDE, CALCIUM CHLORIDE 600; 310; 30; 20 MG/100ML; MG/100ML; MG/100ML; MG/100ML
INJECTION, SOLUTION INTRAVENOUS CONTINUOUS
Status: CANCELLED | OUTPATIENT
Start: 2020-12-15

## 2020-12-15 RX ORDER — POTASSIUM CHLORIDE 20 MEQ/1
20 TABLET, EXTENDED RELEASE ORAL 2 TIMES DAILY
Qty: 6 TABLET | Refills: 0 | Status: SHIPPED | OUTPATIENT
Start: 2020-12-15 | End: 2020-12-18

## 2020-12-15 RX ORDER — URSODIOL 500 MG/1
TABLET, FILM COATED ORAL
Qty: 90 TABLET | Refills: 1 | Status: SHIPPED | OUTPATIENT
Start: 2020-12-15 | End: 2021-01-06 | Stop reason: SDUPTHER

## 2020-12-15 RX ORDER — PANTOPRAZOLE SODIUM 40 MG/10ML
40 INJECTION, POWDER, LYOPHILIZED, FOR SOLUTION INTRAVENOUS 2 TIMES DAILY
Status: CANCELLED | OUTPATIENT
Start: 2020-12-15

## 2020-12-15 RX ORDER — ONDANSETRON 2 MG/ML
8 INJECTION INTRAMUSCULAR; INTRAVENOUS EVERY 6 HOURS PRN
Status: CANCELLED | OUTPATIENT
Start: 2020-12-15

## 2020-12-15 RX ORDER — FAMOTIDINE 10 MG/ML
20 INJECTION INTRAVENOUS ONCE
Status: CANCELLED | OUTPATIENT
Start: 2020-12-15 | End: 2020-12-15

## 2020-12-15 RX ORDER — LIDOCAINE HYDROCHLORIDE 10 MG/ML
1 INJECTION, SOLUTION EPIDURAL; INFILTRATION; INTRACAUDAL; PERINEURAL ONCE
Status: CANCELLED | OUTPATIENT
Start: 2020-12-15 | End: 2020-12-15

## 2020-12-15 RX ORDER — SODIUM CITRATE AND CITRIC ACID MONOHYDRATE 334; 500 MG/5ML; MG/5ML
30 SOLUTION ORAL ONCE
Status: CANCELLED | OUTPATIENT
Start: 2020-12-15 | End: 2020-12-15

## 2020-12-15 NOTE — PROGRESS NOTES
History & Physical    SUBJECTIVE:     History of Present Illness:  Cb Hernandez is a 55 y.o. male who presents for pre-operative exam for weight loss surgery.  he has completed him pre-operative evaluation.  he has failed medical treatment for obesity.  1. Morbid Obesity with Body mass index is 38.12 kg/m². and inability to maintain weight loss.  2. Co-morbidities: HLD, Essential HTN, possible ELI, Arthritis knees and back  3. Under treatment for hpylori and low vit d     Chief Complaint   Patient presents with    Pre-op Exam     sleeve       Review of patient's allergies indicates:  No Known Allergies    Current Outpatient Medications   Medication Sig    aspirin (ECOTRIN) 81 MG EC tablet Take 1 tablet (81 mg total) by mouth once daily.    atorvastatin (LIPITOR) 40 MG tablet 1 tablet    buPROPion (WELLBUTRIN XL) 150 MG TB24 tablet 1 tablet in the morning    carvediloL (COREG) 25 MG tablet 2 tablet    clopidogreL (PLAVIX) 75 mg tablet 1 tablet    escitalopram oxalate (LEXAPRO) 20 MG tablet 1 tablet    hydrALAZINE (APRESOLINE) 25 MG tablet 1 tablet    olmesartan (BENICAR) 40 MG tablet 1 tablet    omeprazole (PRILOSEC) 20 MG capsule Take 1 capsule (20 mg total) by mouth 2 (two) times daily. Take with amoxil and biaxin for 14 days    triamterene-hydrochlorothiazide 37.5-25 mg (MAXZIDE-25) 37.5-25 mg per tablet 1 tablet in the morning     No current facility-administered medications for this visit.        Past Medical History:   Diagnosis Date    Anxiety     Arthritis     Cancer     Coronary artery disease     Depression 8/4/2020    Hyperlipidemia     Hypertension        Past Surgical History:   Procedure Laterality Date    ELBOW SURGERY Bilateral     KNEE SURGERY Bilateral     PROSTATE SURGERY      SHOULDER SURGERY Bilateral        Review of Systems   Constitutional: Negative for fever.   HENT: Positive for congestion.    Respiratory: Negative for cough and shortness of breath.    Cardiovascular:  Negative for chest pain and palpitations.   Gastrointestinal: Negative for abdominal pain, constipation, diarrhea, heartburn, nausea and vomiting.   Genitourinary: Negative for dysuria and urgency.   Endo/Heme/Allergies: Does not bruise/bleed easily.          OBJECTIVE:     Vitals:    12/15/20 1306   BP: 138/76   Pulse: 70   SpO2: 99%   Weight: (!) 136.5 kg (300 lb 14.9 oz)       Physical Exam   Constitutional: He is oriented to person, place, and time and well-developed, well-nourished, and in no distress.   Neurological: He is alert and oriented to person, place, and time.   Skin: Skin is warm and dry.   Psychiatric: Mood, memory, affect and judgment normal.   Vitals reviewed.       Laboratory  CBC: Reviewed  CMP: Reviewed    Diagnostic Results:  ECG: Reviewed  X-Ray: Reviewed  DANICA: Reviewed , TTE I ciarra    Dietitian: Patient has participated in pre-operative nutritional program with understanding of necessary lifelong dietary changes required with surgery.    Psych: No overt contraindications to bariatric surgery, patient has completed psychological evaluation and is able to give informed consent.    Clearance: pcp    ASSESSMENT/PLAN:     Morbid obesity with failure of medical conservative therapy.    Co Morbid Conditions:   Patient Active Problem List   Diagnosis    Essential hypertension    Coronary artery disease    Arthritis    Morbid obesity    Personal history of malignant neoplasm of prostate    History of MI (myocardial infarction)    BMI 38.0-38.9,adult    Coronary artery disease of native artery of native heart with stable angina pectoris       Patient wishes to undergo sleeve gastrectomy.      The patient was informed that risks include, but are not limited to: death, leak, obstruction, bleeding, and sepsis. Any of these could require further surgery. Other risks include DVT, PE, pneumonia, wound dehiscence, hernia, wound infection, the need for dilatations and the inability to lose appropriate  weight and keep it off.     We discussed that our goal is to ameliorate his medical problems and not to obtain a specific body mass index. he understands the risks and benefits and wishes to proceed with the procedure.  he has signed a consent form.

## 2020-12-16 ENCOUNTER — TELEPHONE (OUTPATIENT)
Dept: BARIATRICS | Facility: CLINIC | Age: 55
End: 2020-12-16

## 2020-12-16 NOTE — TELEPHONE ENCOUNTER
----- Message from Faith Rudolph PA-C sent at 12/15/2020  8:32 PM CST -----  Low K, please make sure patients  prescription and takes as directed. Schedule f/u lab in 3 days.

## 2020-12-19 ENCOUNTER — LAB VISIT (OUTPATIENT)
Dept: LAB | Facility: HOSPITAL | Age: 55
End: 2020-12-19
Attending: PHYSICIAN ASSISTANT
Payer: COMMERCIAL

## 2020-12-19 DIAGNOSIS — I25.10 CORONARY ARTERY DISEASE, ANGINA PRESENCE UNSPECIFIED, UNSPECIFIED VESSEL OR LESION TYPE, UNSPECIFIED WHETHER NATIVE OR TRANSPLANTED HEART: ICD-10-CM

## 2020-12-19 DIAGNOSIS — E66.9 OBESITY (BMI 30-39.9): ICD-10-CM

## 2020-12-19 DIAGNOSIS — I10 ESSENTIAL HYPERTENSION: ICD-10-CM

## 2020-12-19 DIAGNOSIS — I10 HYPERTENSION, UNSPECIFIED TYPE: ICD-10-CM

## 2020-12-19 DIAGNOSIS — E66.9 OBESITY, UNSPECIFIED CLASSIFICATION, UNSPECIFIED OBESITY TYPE, UNSPECIFIED WHETHER SERIOUS COMORBIDITY PRESENT: ICD-10-CM

## 2020-12-19 DIAGNOSIS — E87.6 LOW SERUM POTASSIUM LEVEL: ICD-10-CM

## 2020-12-19 LAB
ALBUMIN SERPL BCP-MCNC: 3.8 G/DL (ref 3.5–5.2)
ALP SERPL-CCNC: 72 U/L (ref 55–135)
ALT SERPL W/O P-5'-P-CCNC: 47 U/L (ref 10–44)
ANION GAP SERPL CALC-SCNC: 10 MMOL/L (ref 8–16)
AST SERPL-CCNC: 29 U/L (ref 10–40)
BILIRUB SERPL-MCNC: 0.5 MG/DL (ref 0.1–1)
BUN SERPL-MCNC: 15 MG/DL (ref 6–20)
CALCIUM SERPL-MCNC: 9.1 MG/DL (ref 8.7–10.5)
CHLORIDE SERPL-SCNC: 102 MMOL/L (ref 95–110)
CO2 SERPL-SCNC: 31 MMOL/L (ref 23–29)
CREAT SERPL-MCNC: 1.2 MG/DL (ref 0.5–1.4)
EST. GFR  (AFRICAN AMERICAN): >60 ML/MIN/1.73 M^2
EST. GFR  (NON AFRICAN AMERICAN): >60 ML/MIN/1.73 M^2
GLUCOSE SERPL-MCNC: 115 MG/DL (ref 70–110)
POTASSIUM SERPL-SCNC: 3.1 MMOL/L (ref 3.5–5.1)
PROT SERPL-MCNC: 7.1 G/DL (ref 6–8.4)
SODIUM SERPL-SCNC: 143 MMOL/L (ref 136–145)

## 2020-12-19 PROCEDURE — 80053 COMPREHEN METABOLIC PANEL: CPT

## 2020-12-19 PROCEDURE — 36415 COLL VENOUS BLD VENIPUNCTURE: CPT

## 2020-12-20 ENCOUNTER — LAB VISIT (OUTPATIENT)
Dept: OTOLARYNGOLOGY | Facility: CLINIC | Age: 55
End: 2020-12-20
Payer: COMMERCIAL

## 2020-12-20 DIAGNOSIS — E66.9 OBESITY (BMI 30-39.9): ICD-10-CM

## 2020-12-20 DIAGNOSIS — I10 HYPERTENSION, UNSPECIFIED TYPE: ICD-10-CM

## 2020-12-20 DIAGNOSIS — I25.10 CORONARY ARTERY DISEASE, ANGINA PRESENCE UNSPECIFIED, UNSPECIFIED VESSEL OR LESION TYPE, UNSPECIFIED WHETHER NATIVE OR TRANSPLANTED HEART: ICD-10-CM

## 2020-12-20 DIAGNOSIS — E87.6 LOW SERUM POTASSIUM LEVEL: ICD-10-CM

## 2020-12-20 DIAGNOSIS — I10 ESSENTIAL HYPERTENSION: ICD-10-CM

## 2020-12-20 DIAGNOSIS — E66.9 OBESITY, UNSPECIFIED CLASSIFICATION, UNSPECIFIED OBESITY TYPE, UNSPECIFIED WHETHER SERIOUS COMORBIDITY PRESENT: ICD-10-CM

## 2020-12-20 PROCEDURE — U0003 INFECTIOUS AGENT DETECTION BY NUCLEIC ACID (DNA OR RNA); SEVERE ACUTE RESPIRATORY SYNDROME CORONAVIRUS 2 (SARS-COV-2) (CORONAVIRUS DISEASE [COVID-19]), AMPLIFIED PROBE TECHNIQUE, MAKING USE OF HIGH THROUGHPUT TECHNOLOGIES AS DESCRIBED BY CMS-2020-01-R: HCPCS

## 2020-12-21 DIAGNOSIS — E87.6 HYPOKALEMIA: Primary | ICD-10-CM

## 2020-12-21 LAB — SARS-COV-2 RNA RESP QL NAA+PROBE: NOT DETECTED

## 2020-12-21 RX ORDER — POTASSIUM CHLORIDE 20 MEQ/1
20 TABLET, EXTENDED RELEASE ORAL 2 TIMES DAILY
Qty: 6 TABLET | Refills: 0 | Status: SHIPPED | OUTPATIENT
Start: 2020-12-21 | End: 2020-12-24

## 2020-12-22 ENCOUNTER — TELEPHONE (OUTPATIENT)
Dept: BARIATRICS | Facility: CLINIC | Age: 55
End: 2020-12-22

## 2020-12-22 ENCOUNTER — ANESTHESIA EVENT (OUTPATIENT)
Dept: SURGERY | Facility: HOSPITAL | Age: 55
DRG: 621 | End: 2020-12-22
Payer: COMMERCIAL

## 2020-12-22 NOTE — TELEPHONE ENCOUNTER
Notified patient of arrival time [place] [facility] at 0745 with expected surgery start time 0950 on 12-23-20.  Instructed to drink 8 ounces of plain water at 0715.  Instructed patient regarding pre-op instructions including no protein shakes or sugar free clear liquids after midnight but can have a rare sip of water for comfort, showering and preop medications to hold/take per anesthesia/preop.       Instructed patient on the s/s of dehydration and for patient to call at the first sign of dehydration.  Informed patient that someone from bariatrics will call them 1 week post op to review diet/fluid intake and to ensure adequate hydration.  Reminded patient not to take antibiotics for 30 days following surgery or schedule elective procedures involving anesthesia/sedation for 30 days following surgery unless checking with the bariatric clinic first.  Patient verbalized understanding. Patient given office phone number for any additional questions/concerns.    He is currently checked in at the [facility].  Medications reviewed and medication instructions given.  Stated that he has stopped his Plavix.

## 2020-12-22 NOTE — ANESTHESIA PREPROCEDURE EVALUATION
[facility]  Anesthesia Pre-Operative Evaluation         Patient Name: Cb Hernandez  YOB: 1965  MRN: [id number]    SUBJECTIVE:     Pre-operative evaluation for Procedure(s) (LRB):  GASTRECTOMY, SLEEVE, LAPAROSCOPIC, with intraop EGD (N/A)     12/22/2020    Cb Hernandez is a 55 y.o. male w/ a significant PMHx of HTN, HLD, MI s/p PCI with LARISSA to LAD in Jan 2020 on Plavix, and morbid obesity who now presents for the above procedure(s).      LDA: None documented.    Prev airway: None documented.    Drips: None documented.    Patient Active Problem List   Diagnosis    Essential hypertension    Coronary artery disease    Arthritis    Morbid obesity    Personal history of malignant neoplasm of prostate    History of MI (myocardial infarction)    BMI 38.0-38.9,adult    Coronary artery disease of native artery of native heart with stable angina pectoris       Review of patient's allergies indicates:  No Known Allergies    Current Outpatient Medications:  No current facility-administered medications for this encounter.     Current Outpatient Medications:     aspirin (ECOTRIN) 81 MG EC tablet, Take 1 tablet (81 mg total) by mouth once daily. (Patient taking differently: Take 81 mg by mouth every morning. ), Disp: 30 tablet, Rfl: 0    atorvastatin (LIPITOR) 40 MG tablet, Take 40 mg by mouth every morning. , Disp: , Rfl:     buPROPion (WELLBUTRIN XL) 150 MG TB24 tablet, Take 150 mg by mouth every morning. , Disp: , Rfl:     carvediloL (COREG) 25 MG tablet, Take 25 mg by mouth 2 (two) times daily. , Disp: , Rfl:     clopidogreL (PLAVIX) 75 mg tablet, 1 tablet, Disp: , Rfl:     escitalopram oxalate (LEXAPRO) 20 MG tablet, Take 20 mg by mouth every morning. , Disp: , Rfl:     hydrALAZINE (APRESOLINE) 25 MG tablet, Take 25 mg by mouth every 12 (twelve) hours. , Disp: , Rfl:      hydrocodone-acetaminophen (HYCET) solution 7.5-325 mg/15mL, Take 15 mLs by mouth 4 (four) times daily as needed for Pain., Disp: 200 mL, Rfl: 0    olmesartan (BENICAR) 40 MG tablet, Take 40 mg by mouth every morning. , Disp: , Rfl:     omeprazole (PRILOSEC) 40 MG capsule, Take 1 capsule (40 mg total) by mouth every morning. Open capsule and take with apple sauce, Disp: 30 capsule, Rfl: 2    ondansetron (ZOFRAN-ODT) 8 MG TbDL, Dissolve 1 tablet (8 mg total) by mouth every 6 (six) hours as needed., Disp: 30 tablet, Rfl: 0    polyethylene glycol (GLYCOLAX) 17 gram/dose powder, Dissolve one capful (17 g) in liquid and take by mouth once daily., Disp: 510 g, Rfl: 3    potassium chloride SA (K-DUR,KLOR-CON) 20 MEQ tablet, Take 1 tablet (20 mEq total) by mouth 2 (two) times daily. for 3 days (Patient not taking: Reported on 12/22/2020), Disp: 6 tablet, Rfl: 0    ursodioL (LEATHA FORTE) 500 MG tablet, Crush one tablet daily for gall bladder., Disp: 90 tablet, Rfl: 1    Past Surgical History:   Procedure Laterality Date    ELBOW SURGERY Bilateral     KNEE SURGERY Bilateral     PROSTATE SURGERY      SHOULDER SURGERY Bilateral        Social History     Socioeconomic History    Marital status: Single     Spouse name: Not on file    Number of children: Not on file    Years of education: Not on file    Highest education level: Not on file   Occupational History    Not on file   Social Needs    Financial resource strain: Not on file    Food insecurity     Worry: Not on file     Inability: Not on file    Transportation needs     Medical: Not on file     Non-medical: Not on file   Tobacco Use    Smoking status: Never Smoker    Smokeless tobacco: Never Used   Substance and Sexual Activity    Alcohol use: Yes     Comment: socially    Drug use: Never    Sexual activity: Yes     Partners: Female   Lifestyle    Physical activity     Days per week: Not on file     Minutes per session: Not on file    Stress: Not on  file   Relationships    Social connections     Talks on phone: Not on file     Gets together: Not on file     Attends Alevism service: Not on file     Active member of club or organization: Not on file     Attends meetings of clubs or organizations: Not on file     Relationship status: Not on file   Other Topics Concern    Not on file   Social History Narrative    Not on file       OBJECTIVE:     Vital Signs Range (Last 24H):         Significant Labs:  Lab Results   Component Value Date    WBC 7.33 12/15/2020    HGB 13.2 (L) 12/15/2020    HCT 39.1 (L) 12/15/2020     12/15/2020    CHOL 109 (L) 08/12/2020    TRIG 68 08/12/2020    HDL 49 08/12/2020    ALT 47 (H) 12/19/2020    AST 29 12/19/2020     12/19/2020    K 3.1 (L) 12/19/2020     12/19/2020    CREATININE 1.2 12/19/2020    BUN 15 12/19/2020    CO2 31 (H) 12/19/2020    TSH 1.223 08/12/2020    HGBA1C 5.6 08/12/2020       Diagnostic Studies:     Regadenoson Stress Test 8/26/20:  The study shows normal myocardial perfusion.    The perfusion scan is free of evidence from myocardial ischemia or injury.    There is a mild intensity fixed defect in the inferobasilar wall of the left ventricle, secondary to soft tissue attenuation.    Gated perfusion images showed an ejection fraction of 58% at rest and 57% post stress.    The EKG portion of this study is negative for ischemia.    The patient reported no chest pain during the stress test.    EKG:   Results for orders placed or performed during the hospital encounter of 08/12/20   EKG    Collection Time: 08/12/20  8:17 AM    Narrative    Test Reason : I10,I25.10,C80.1,M19.90,F41.9,Z68.38,    Vent. Rate : 063 BPM     Atrial Rate : 063 BPM     P-R Int : 190 ms          QRS Dur : 100 ms      QT Int : 416 ms       P-R-T Axes : 057 -23 004 degrees     QTc Int : 425 ms    Normal sinus rhythm  Possible Left atrial enlargement  Possible Anterior infarct ,age undetermined  Abnormal ECG  No previous ECGs  available  Confirmed by MINE QUIJANO MD ([id number]) on 8/12/2020 11:22:03 PM    Referred By: NATALIYA GUNTER           Confirmed By:MINE QUIJANO MD       2D ECHO:  TTE:  Results for orders placed or performed during the hospital encounter of 08/27/20   Echo Color Flow Doppler? Yes   Result Value Ref Range    BSA 2.7 m2    TDI SEPTAL 0.05 m/s    LV LATERAL E/E' RATIO 12.50 m/s    LV SEPTAL E/E' RATIO 15.00 m/s    LA WIDTH 3.52 cm    TDI LATERAL 0.06 m/s    PV PEAK VELOCITY 1.12 cm/s    LVIDd 4.91 3.5 - 6.0 cm    IVS 1.42 (A) 0.6 - 1.1 cm    Posterior Wall 1.38 (A) 0.6 - 1.1 cm    Ao root annulus 3.11 cm    LVIDs 3.00 2.1 - 4.0 cm    FS 39 28 - 44 %    LA volume 61.16 cm3    Sinus 3.15 cm    STJ 2.54 cm    Ascending aorta 3.19 cm    LV mass 283.43 g    LA size 3.74 cm    RVDD 2.68 cm    Left Ventricle Relative Wall Thickness 0.56 cm    AV mean gradient 5 mmHg    AV valve area 3.53 cm2    AV Velocity Ratio 0.88     AV index (prosthetic) 0.90     MV valve area p 1/2 method 3.69 cm2    PV peak gradient 2.75 mmHg    E/A ratio 0.78     Mean e' 0.06 m/s    E wave decelartion time 205.74 msec    IVRT 77.07 msec    Pulm vein S/D ratio 1.20     LVOT diameter 2.24 cm    LVOT area 3.9 cm2    LVOT peak carlos 1.33 m/s    LVOT peak VTI 22.28 cm    Ao peak carlos 1.52 m/s    Ao VTI 24.83 cm    RVOT peak carlos 0.83 m/s    RVOT peak VTI 14.97 cm    LVOT stroke volume 87.76 cm3    AV peak gradient 9 mmHg    PV mean gradient 1.65 mmHg    E/E' ratio 13.64 m/s    MV Peak E Carlos 0.75 m/s    MV stenosis pressure 1/2 time 59.66 ms    MV Peak A Carlos 0.96 m/s    PV Peak S Carlos 0.66 m/s    PV Peak D Carlos 0.55 m/s    LV Systolic Volume 34.89 mL    LV Systolic Volume Index 13.3 mL/m2    LV Diastolic Volume 113.58 mL    LV Diastolic Volume Index 43.38 mL/m2    LA Volume Index 23.4 mL/m2    LV Mass Index 108 g/m2    RA Major Axis 4.16 cm    Left Atrium Minor Axis 5.16 cm    Left Atrium Major Axis 5.81 cm    RA Width 2.94 cm    Narrative    · Concentric left  ventricular remodeling.  · Normal left ventricular systolic function. The estimated ejection   fraction is 60%.  · Grade I (mild) left ventricular diastolic dysfunction consistent with   impaired relaxation.  · Normal right ventricular systolic function.          DANICA:  No results found for this or any previous visit.    ASSESSMENT/PLAN:       Anesthesia Evaluation    I have reviewed the Patient Summary Reports.    I have reviewed the Nursing Notes. I have reviewed the NPO Status.   I have reviewed the Medications.     Review of Systems  Anesthesia Hx:  History of prior surgery of interest to airway management or planning:  Denies Personal Hx of Anesthesia complications.   Hematology/Oncology:  Hematology Normal       -- Cancer in past history:  Oncology Comments: H/o prostate cancer     Cardiovascular:   Hypertension, well controlled Past MI CAD  CABG/stent   Denies Angina.             hyperlipidemia        Pulmonary:  Pulmonary Normal    Renal/:  Renal/ Normal     Hepatic/GI:  Hepatic/GI Normal    Musculoskeletal:   Arthritis     Neurological:  Neurology Normal    Endocrine:  Endocrine Normal    Psych:   depression          Physical Exam  General:  Morbid Obesity    Airway/Jaw/Neck:  Airway Findings: Mouth Opening: Normal Tongue: Normal  General Airway Assessment: Adult  Mallampati: III  Improves to II with phonation.  TM Distance: Normal, at least 6 cm  Jaw/Neck Findings:  Neck ROM: Normal ROM  Neck Findings:  Girth Increased      Dental:  Dental Findings: Periodontal disease, Mild    Chest/Lungs:  Chest/Lungs Findings: Clear to auscultation, Normal Respiratory Rate     Heart/Vascular:  Heart Findings: Rate: Normal  Rhythm: Regular Rhythm  Sounds: Normal  Heart murmur: negative       Mental Status:  Mental Status Findings:  Cooperative, Alert and Oriented         Anesthesia Plan  Type of Anesthesia, risks & benefits discussed:  Anesthesia Type:  general  Patient's Preference:   Intra-op Monitoring Plan:  standard ASA monitors  Intra-op Monitoring Plan Comments:   Post Op Pain Control Plan: per primary service following discharge from PACU, IV/PO Opioids PRN and multimodal analgesia  Post Op Pain Control Plan Comments:   Induction:   IV  Beta Blocker:  Patient is on a Beta-Blocker and has received one dose within the past 24 hours (No further documentation required).       Informed Consent: Patient understands risks and agrees with Anesthesia plan.  Questions answered. Anesthesia consent signed with patient.  ASA Score: 3     Day of Surgery Review of History & Physical: I have interviewed and examined the patient. I have reviewed the patient's H&P dated:  There are no significant changes.  H&P update referred to the surgeon.         Ready For Surgery From Anesthesia Perspective.

## 2020-12-23 ENCOUNTER — DOCUMENTATION ONLY (OUTPATIENT)
Dept: BARIATRICS | Facility: CLINIC | Age: 55
End: 2020-12-23

## 2020-12-23 ENCOUNTER — TELEPHONE (OUTPATIENT)
Dept: BARIATRICS | Facility: CLINIC | Age: 55
End: 2020-12-23

## 2020-12-23 ENCOUNTER — HOSPITAL ENCOUNTER (INPATIENT)
Facility: HOSPITAL | Age: 55
LOS: 2 days | Discharge: HOME | DRG: 621 | End: 2020-12-25
Attending: SURGERY | Admitting: SURGERY
Payer: COMMERCIAL

## 2020-12-23 ENCOUNTER — ANESTHESIA (OUTPATIENT)
Dept: SURGERY | Facility: HOSPITAL | Age: 55
DRG: 621 | End: 2020-12-23
Payer: COMMERCIAL

## 2020-12-23 PROBLEM — E66.9 OBESITY: Status: ACTIVE | Noted: 2020-12-23

## 2020-12-23 LAB
ANION GAP SERPL CALC-SCNC: 9 MMOL/L (ref 8–16)
BUN SERPL-MCNC: 16 MG/DL (ref 6–20)
CALCIUM SERPL-MCNC: 9.1 MG/DL (ref 8.7–10.5)
CHLORIDE SERPL-SCNC: 103 MMOL/L (ref 95–110)
CO2 SERPL-SCNC: 30 MMOL/L (ref 23–29)
CREAT SERPL-MCNC: 1.4 MG/DL (ref 0.5–1.4)
EST. GFR  (AFRICAN AMERICAN): >60 ML/MIN/1.73 M^2
EST. GFR  (NON AFRICAN AMERICAN): 55.4 ML/MIN/1.73 M^2
GLUCOSE SERPL-MCNC: 89 MG/DL (ref 70–110)
POTASSIUM SERPL-SCNC: 3.2 MMOL/L (ref 3.5–5.1)
SODIUM SERPL-SCNC: 142 MMOL/L (ref 136–145)

## 2020-12-23 PROCEDURE — 63600175 PHARM REV CODE 636 W HCPCS: Performed by: STUDENT IN AN ORGANIZED HEALTH CARE EDUCATION/TRAINING PROGRAM

## 2020-12-23 PROCEDURE — 88342 IMHCHEM/IMCYTCHM 1ST ANTB: CPT | Performed by: PATHOLOGY

## 2020-12-23 PROCEDURE — 99900035 HC TECH TIME PER 15 MIN (STAT)

## 2020-12-23 PROCEDURE — 43775 PR LAP, GAST RESTRICT PROC, LONGITUDINAL GASTRECTOMY: ICD-10-PCS | Mod: ,,, | Performed by: SURGERY

## 2020-12-23 PROCEDURE — 27201423 OPTIME MED/SURG SUP & DEVICES STERILE SUPPLY: Performed by: SURGERY

## 2020-12-23 PROCEDURE — 25000003 PHARM REV CODE 250: Performed by: STUDENT IN AN ORGANIZED HEALTH CARE EDUCATION/TRAINING PROGRAM

## 2020-12-23 PROCEDURE — 71000016 HC POSTOP RECOV ADDL HR: Performed by: SURGERY

## 2020-12-23 PROCEDURE — 11000001 HC ACUTE MED/SURG PRIVATE ROOM

## 2020-12-23 PROCEDURE — C9113 INJ PANTOPRAZOLE SODIUM, VIA: HCPCS | Performed by: STUDENT IN AN ORGANIZED HEALTH CARE EDUCATION/TRAINING PROGRAM

## 2020-12-23 PROCEDURE — 36000711: Performed by: SURGERY

## 2020-12-23 PROCEDURE — 71000039 HC RECOVERY, EACH ADD'L HOUR: Performed by: SURGERY

## 2020-12-23 PROCEDURE — 88307 TISSUE EXAM BY PATHOLOGIST: CPT | Mod: 26,,, | Performed by: PATHOLOGY

## 2020-12-23 PROCEDURE — 36000710: Performed by: SURGERY

## 2020-12-23 PROCEDURE — 25000003 PHARM REV CODE 250: Performed by: ANESTHESIOLOGY

## 2020-12-23 PROCEDURE — 63600175 PHARM REV CODE 636 W HCPCS: Performed by: ANESTHESIOLOGY

## 2020-12-23 PROCEDURE — 88307 TISSUE EXAM BY PATHOLOGIST: CPT | Performed by: PATHOLOGY

## 2020-12-23 PROCEDURE — 88341 IMHCHEM/IMCYTCHM EA ADD ANTB: CPT | Performed by: PATHOLOGY

## 2020-12-23 PROCEDURE — 25000003 PHARM REV CODE 250: Performed by: SURGERY

## 2020-12-23 PROCEDURE — 80048 BASIC METABOLIC PNL TOTAL CA: CPT

## 2020-12-23 PROCEDURE — C9113 INJ PANTOPRAZOLE SODIUM, VIA: HCPCS | Performed by: SURGERY

## 2020-12-23 PROCEDURE — 88341 PR IHC OR ICC EACH ADD'L SINGLE ANTIBODY  STAINPR: ICD-10-PCS | Mod: 26,,, | Performed by: PATHOLOGY

## 2020-12-23 PROCEDURE — D9220A PRA ANESTHESIA: Mod: ,,, | Performed by: ANESTHESIOLOGY

## 2020-12-23 PROCEDURE — 71000015 HC POSTOP RECOV 1ST HR: Performed by: SURGERY

## 2020-12-23 PROCEDURE — 37000008 HC ANESTHESIA 1ST 15 MINUTES: Performed by: SURGERY

## 2020-12-23 PROCEDURE — D9220A PRA ANESTHESIA: ICD-10-PCS | Mod: ,,, | Performed by: ANESTHESIOLOGY

## 2020-12-23 PROCEDURE — 63600175 PHARM REV CODE 636 W HCPCS: Performed by: SURGERY

## 2020-12-23 PROCEDURE — 88341 IMHCHEM/IMCYTCHM EA ADD ANTB: CPT | Mod: 26,,, | Performed by: PATHOLOGY

## 2020-12-23 PROCEDURE — 94761 N-INVAS EAR/PLS OXIMETRY MLT: CPT

## 2020-12-23 PROCEDURE — 88342 CHG IMMUNOCYTOCHEMISTRY: ICD-10-PCS | Mod: 26,,, | Performed by: PATHOLOGY

## 2020-12-23 PROCEDURE — 88342 IMHCHEM/IMCYTCHM 1ST ANTB: CPT | Mod: 26,,, | Performed by: PATHOLOGY

## 2020-12-23 PROCEDURE — 88307 PR  SURG PATH,LEVEL V: ICD-10-PCS | Mod: 26,,, | Performed by: PATHOLOGY

## 2020-12-23 PROCEDURE — 94799 UNLISTED PULMONARY SVC/PX: CPT

## 2020-12-23 PROCEDURE — 43775 LAP SLEEVE GASTRECTOMY: CPT | Mod: ,,, | Performed by: SURGERY

## 2020-12-23 PROCEDURE — 37000009 HC ANESTHESIA EA ADD 15 MINS: Performed by: SURGERY

## 2020-12-23 PROCEDURE — 71000033 HC RECOVERY, INTIAL HOUR: Performed by: SURGERY

## 2020-12-23 RX ORDER — HYDROCODONE BITARTRATE AND ACETAMINOPHEN 7.5; 325 MG/15ML; MG/15ML
15 SOLUTION ORAL EVERY 4 HOURS PRN
Status: DISCONTINUED | OUTPATIENT
Start: 2020-12-23 | End: 2020-12-25 | Stop reason: HOSPADM

## 2020-12-23 RX ORDER — DEXMEDETOMIDINE HYDROCHLORIDE 100 UG/ML
INJECTION, SOLUTION INTRAVENOUS
Status: DISCONTINUED | OUTPATIENT
Start: 2020-12-23 | End: 2020-12-23

## 2020-12-23 RX ORDER — MIDAZOLAM HYDROCHLORIDE 1 MG/ML
INJECTION INTRAMUSCULAR; INTRAVENOUS
Status: DISCONTINUED | OUTPATIENT
Start: 2020-12-23 | End: 2020-12-23

## 2020-12-23 RX ORDER — HYDROMORPHONE HYDROCHLORIDE 1 MG/ML
0.2 INJECTION, SOLUTION INTRAMUSCULAR; INTRAVENOUS; SUBCUTANEOUS EVERY 5 MIN PRN
Status: COMPLETED | OUTPATIENT
Start: 2020-12-23 | End: 2020-12-23

## 2020-12-23 RX ORDER — FENTANYL CITRATE 50 UG/ML
INJECTION, SOLUTION INTRAMUSCULAR; INTRAVENOUS
Status: DISCONTINUED | OUTPATIENT
Start: 2020-12-23 | End: 2020-12-23

## 2020-12-23 RX ORDER — PROPOFOL 10 MG/ML
VIAL (ML) INTRAVENOUS
Status: DISCONTINUED | OUTPATIENT
Start: 2020-12-23 | End: 2020-12-23

## 2020-12-23 RX ORDER — HYDROMORPHONE HYDROCHLORIDE 1 MG/ML
0.5 INJECTION, SOLUTION INTRAMUSCULAR; INTRAVENOUS; SUBCUTANEOUS
Status: DISCONTINUED | OUTPATIENT
Start: 2020-12-23 | End: 2020-12-24

## 2020-12-23 RX ORDER — LIDOCAINE HYDROCHLORIDE 20 MG/ML
INJECTION, SOLUTION EPIDURAL; INFILTRATION; INTRACAUDAL; PERINEURAL
Status: DISCONTINUED | OUTPATIENT
Start: 2020-12-23 | End: 2020-12-23

## 2020-12-23 RX ORDER — METOCLOPRAMIDE HYDROCHLORIDE 5 MG/ML
10 INJECTION INTRAMUSCULAR; INTRAVENOUS ONCE
Status: COMPLETED | OUTPATIENT
Start: 2020-12-23 | End: 2020-12-23

## 2020-12-23 RX ORDER — SODIUM CHLORIDE, SODIUM LACTATE, POTASSIUM CHLORIDE, CALCIUM CHLORIDE 600; 310; 30; 20 MG/100ML; MG/100ML; MG/100ML; MG/100ML
INJECTION, SOLUTION INTRAVENOUS CONTINUOUS
Status: DISCONTINUED | OUTPATIENT
Start: 2020-12-23 | End: 2020-12-24

## 2020-12-23 RX ORDER — METHOCARBAMOL 500 MG/1
1000 TABLET, FILM COATED ORAL ONCE
Status: DISCONTINUED | OUTPATIENT
Start: 2020-12-23 | End: 2020-12-23

## 2020-12-23 RX ORDER — BUPROPION HYDROCHLORIDE 150 MG/1
150 TABLET ORAL EVERY MORNING
Status: DISCONTINUED | OUTPATIENT
Start: 2020-12-24 | End: 2020-12-25 | Stop reason: HOSPADM

## 2020-12-23 RX ORDER — LIDOCAINE HYDROCHLORIDE 10 MG/ML
1 INJECTION, SOLUTION EPIDURAL; INFILTRATION; INTRACAUDAL; PERINEURAL ONCE
Status: COMPLETED | OUTPATIENT
Start: 2020-12-23 | End: 2020-12-23

## 2020-12-23 RX ORDER — DEXAMETHASONE SODIUM PHOSPHATE 4 MG/ML
INJECTION, SOLUTION INTRA-ARTICULAR; INTRALESIONAL; INTRAMUSCULAR; INTRAVENOUS; SOFT TISSUE
Status: DISCONTINUED | OUTPATIENT
Start: 2020-12-23 | End: 2020-12-23

## 2020-12-23 RX ORDER — ENOXAPARIN SODIUM 100 MG/ML
40 INJECTION SUBCUTANEOUS EVERY 24 HOURS
Status: DISCONTINUED | OUTPATIENT
Start: 2020-12-23 | End: 2020-12-25 | Stop reason: HOSPADM

## 2020-12-23 RX ORDER — ACETAMINOPHEN 650 MG/20.3ML
500 LIQUID ORAL EVERY 8 HOURS
Status: DISPENSED | OUTPATIENT
Start: 2020-12-23 | End: 2020-12-24

## 2020-12-23 RX ORDER — SODIUM CHLORIDE 0.9 % (FLUSH) 0.9 %
10 SYRINGE (ML) INJECTION
Status: DISCONTINUED | OUTPATIENT
Start: 2020-12-23 | End: 2020-12-23

## 2020-12-23 RX ORDER — GABAPENTIN 250 MG/5ML
300 SOLUTION ORAL 3 TIMES DAILY
Status: DISCONTINUED | OUTPATIENT
Start: 2020-12-23 | End: 2020-12-25 | Stop reason: HOSPADM

## 2020-12-23 RX ORDER — DEXTROMETHORPHAN/PSEUDOEPHED 2.5-7.5/.8
40 DROPS ORAL 4 TIMES DAILY PRN
Status: DISCONTINUED | OUTPATIENT
Start: 2020-12-23 | End: 2020-12-25 | Stop reason: HOSPADM

## 2020-12-23 RX ORDER — CARVEDILOL 25 MG/1
25 TABLET ORAL 2 TIMES DAILY
Status: DISCONTINUED | OUTPATIENT
Start: 2020-12-24 | End: 2020-12-25 | Stop reason: HOSPADM

## 2020-12-23 RX ORDER — OXYCODONE HYDROCHLORIDE 5 MG/1
5 TABLET ORAL
Status: DISCONTINUED | OUTPATIENT
Start: 2020-12-23 | End: 2020-12-23

## 2020-12-23 RX ORDER — ROCURONIUM BROMIDE 10 MG/ML
INJECTION, SOLUTION INTRAVENOUS
Status: DISCONTINUED | OUTPATIENT
Start: 2020-12-23 | End: 2020-12-23

## 2020-12-23 RX ORDER — SODIUM CITRATE AND CITRIC ACID MONOHYDRATE 334; 500 MG/5ML; MG/5ML
30 SOLUTION ORAL ONCE
Status: COMPLETED | OUTPATIENT
Start: 2020-12-23 | End: 2020-12-23

## 2020-12-23 RX ORDER — ONDANSETRON 2 MG/ML
4 INJECTION INTRAMUSCULAR; INTRAVENOUS DAILY PRN
Status: DISCONTINUED | OUTPATIENT
Start: 2020-12-23 | End: 2020-12-23

## 2020-12-23 RX ORDER — ONDANSETRON 2 MG/ML
8 INJECTION INTRAMUSCULAR; INTRAVENOUS EVERY 6 HOURS PRN
Status: DISCONTINUED | OUTPATIENT
Start: 2020-12-23 | End: 2020-12-25 | Stop reason: HOSPADM

## 2020-12-23 RX ORDER — SODIUM CHLORIDE 9 MG/ML
INJECTION, SOLUTION INTRAVENOUS CONTINUOUS
Status: DISCONTINUED | OUTPATIENT
Start: 2020-12-23 | End: 2020-12-23

## 2020-12-23 RX ORDER — HYDRALAZINE HYDROCHLORIDE 25 MG/1
25 TABLET, FILM COATED ORAL EVERY 12 HOURS
Status: DISCONTINUED | OUTPATIENT
Start: 2020-12-24 | End: 2020-12-25 | Stop reason: HOSPADM

## 2020-12-23 RX ORDER — ONDANSETRON 2 MG/ML
INJECTION INTRAMUSCULAR; INTRAVENOUS
Status: DISCONTINUED | OUTPATIENT
Start: 2020-12-23 | End: 2020-12-23

## 2020-12-23 RX ORDER — CEFAZOLIN SODIUM 1 G/3ML
INJECTION, POWDER, FOR SOLUTION INTRAMUSCULAR; INTRAVENOUS
Status: DISCONTINUED | OUTPATIENT
Start: 2020-12-23 | End: 2020-12-23

## 2020-12-23 RX ORDER — HALOPERIDOL 5 MG/ML
1 INJECTION INTRAMUSCULAR ONCE
Status: COMPLETED | OUTPATIENT
Start: 2020-12-23 | End: 2020-12-23

## 2020-12-23 RX ORDER — KETAMINE HCL IN 0.9 % NACL 50 MG/5 ML
SYRINGE (ML) INTRAVENOUS
Status: DISCONTINUED | OUTPATIENT
Start: 2020-12-23 | End: 2020-12-23

## 2020-12-23 RX ORDER — PANTOPRAZOLE SODIUM 40 MG/10ML
40 INJECTION, POWDER, LYOPHILIZED, FOR SOLUTION INTRAVENOUS 2 TIMES DAILY
Status: DISCONTINUED | OUTPATIENT
Start: 2020-12-23 | End: 2020-12-25 | Stop reason: HOSPADM

## 2020-12-23 RX ORDER — VASOPRESSIN 20 [USP'U]/ML
INJECTION, SOLUTION INTRAMUSCULAR; SUBCUTANEOUS
Status: DISCONTINUED | OUTPATIENT
Start: 2020-12-23 | End: 2020-12-23

## 2020-12-23 RX ORDER — ASPIRIN 81 MG/1
81 TABLET ORAL DAILY
Status: DISCONTINUED | OUTPATIENT
Start: 2020-12-24 | End: 2020-12-25 | Stop reason: HOSPADM

## 2020-12-23 RX ORDER — MUPIROCIN 20 MG/G
OINTMENT TOPICAL
Status: DISCONTINUED | OUTPATIENT
Start: 2020-12-23 | End: 2020-12-23

## 2020-12-23 RX ORDER — DIAZEPAM 10 MG/2ML
2.5 INJECTION INTRAMUSCULAR ONCE
Status: COMPLETED | OUTPATIENT
Start: 2020-12-23 | End: 2020-12-23

## 2020-12-23 RX ORDER — HEPARIN SODIUM 5000 [USP'U]/ML
5000 INJECTION, SOLUTION INTRAVENOUS; SUBCUTANEOUS ONCE
Status: COMPLETED | OUTPATIENT
Start: 2020-12-23 | End: 2020-12-23

## 2020-12-23 RX ORDER — ENOXAPARIN SODIUM 100 MG/ML
40 INJECTION SUBCUTANEOUS EVERY 24 HOURS
Status: DISCONTINUED | OUTPATIENT
Start: 2020-12-23 | End: 2020-12-23

## 2020-12-23 RX ORDER — FENTANYL CITRATE 50 UG/ML
25 INJECTION, SOLUTION INTRAMUSCULAR; INTRAVENOUS EVERY 5 MIN PRN
Status: COMPLETED | OUTPATIENT
Start: 2020-12-23 | End: 2020-12-23

## 2020-12-23 RX ORDER — BUPIVACAINE HYDROCHLORIDE 2.5 MG/ML
INJECTION, SOLUTION EPIDURAL; INFILTRATION; INTRACAUDAL
Status: DISCONTINUED | OUTPATIENT
Start: 2020-12-23 | End: 2020-12-23 | Stop reason: HOSPADM

## 2020-12-23 RX ORDER — PHENYLEPHRINE HYDROCHLORIDE 10 MG/ML
INJECTION INTRAVENOUS
Status: DISCONTINUED | OUTPATIENT
Start: 2020-12-23 | End: 2020-12-23

## 2020-12-23 RX ORDER — SODIUM CHLORIDE 9 MG/ML
INJECTION, SOLUTION INTRAVENOUS CONTINUOUS PRN
Status: DISCONTINUED | OUTPATIENT
Start: 2020-12-23 | End: 2020-12-23

## 2020-12-23 RX ORDER — ATORVASTATIN CALCIUM 20 MG/1
40 TABLET, FILM COATED ORAL EVERY MORNING
Status: DISCONTINUED | OUTPATIENT
Start: 2020-12-24 | End: 2020-12-25 | Stop reason: HOSPADM

## 2020-12-23 RX ORDER — FAMOTIDINE 10 MG/ML
20 INJECTION INTRAVENOUS ONCE
Status: COMPLETED | OUTPATIENT
Start: 2020-12-23 | End: 2020-12-23

## 2020-12-23 RX ORDER — ESCITALOPRAM OXALATE 20 MG/1
20 TABLET ORAL EVERY MORNING
Status: DISCONTINUED | OUTPATIENT
Start: 2020-12-24 | End: 2020-12-25 | Stop reason: HOSPADM

## 2020-12-23 RX ORDER — OLMESARTAN MEDOXOMIL 20 MG/1
40 TABLET ORAL EVERY MORNING
Status: DISCONTINUED | OUTPATIENT
Start: 2020-12-24 | End: 2020-12-25 | Stop reason: HOSPADM

## 2020-12-23 RX ADMIN — Medication 10 MG: at 12:12

## 2020-12-23 RX ADMIN — FENTANYL CITRATE 25 MCG: 50 INJECTION, SOLUTION INTRAMUSCULAR; INTRAVENOUS at 01:12

## 2020-12-23 RX ADMIN — Medication 30 MG: at 11:12

## 2020-12-23 RX ADMIN — VASOPRESSIN 2 UNITS: 20 INJECTION INTRAVENOUS at 12:12

## 2020-12-23 RX ADMIN — DEXMEDETOMIDINE HYDROCHLORIDE 8 MCG: 100 INJECTION, SOLUTION INTRAVENOUS at 12:12

## 2020-12-23 RX ADMIN — SODIUM CHLORIDE, SODIUM LACTATE, POTASSIUM CHLORIDE, AND CALCIUM CHLORIDE: .6; .31; .03; .02 INJECTION, SOLUTION INTRAVENOUS at 07:12

## 2020-12-23 RX ADMIN — METOCLOPRAMIDE 10 MG: 5 INJECTION, SOLUTION INTRAMUSCULAR; INTRAVENOUS at 10:12

## 2020-12-23 RX ADMIN — PROPOFOL 30 MG: 10 INJECTION, EMULSION INTRAVENOUS at 12:12

## 2020-12-23 RX ADMIN — FAMOTIDINE 20 MG: 10 INJECTION INTRAVENOUS at 10:12

## 2020-12-23 RX ADMIN — DEXAMETHASONE SODIUM PHOSPHATE 4 MG: 4 INJECTION, SOLUTION INTRAMUSCULAR; INTRAVENOUS at 11:12

## 2020-12-23 RX ADMIN — ONDANSETRON 4 MG: 2 INJECTION INTRAMUSCULAR; INTRAVENOUS at 12:12

## 2020-12-23 RX ADMIN — FENTANYL CITRATE 150 MCG: 50 INJECTION, SOLUTION INTRAMUSCULAR; INTRAVENOUS at 11:12

## 2020-12-23 RX ADMIN — PANTOPRAZOLE SODIUM 40 MG: 40 INJECTION, POWDER, FOR SOLUTION INTRAVENOUS at 01:12

## 2020-12-23 RX ADMIN — PHENYLEPHRINE HYDROCHLORIDE 100 MCG: 10 INJECTION INTRAVENOUS at 11:12

## 2020-12-23 RX ADMIN — HYDROMORPHONE HYDROCHLORIDE 0.5 MG: 1 INJECTION, SOLUTION INTRAMUSCULAR; INTRAVENOUS; SUBCUTANEOUS at 09:12

## 2020-12-23 RX ADMIN — HYDROMORPHONE HYDROCHLORIDE 0.2 MG: 1 INJECTION, SOLUTION INTRAMUSCULAR; INTRAVENOUS; SUBCUTANEOUS at 02:12

## 2020-12-23 RX ADMIN — PHENYLEPHRINE HYDROCHLORIDE 200 MCG: 10 INJECTION INTRAVENOUS at 11:12

## 2020-12-23 RX ADMIN — HYDROMORPHONE HYDROCHLORIDE 0.5 MG: 1 INJECTION, SOLUTION INTRAMUSCULAR; INTRAVENOUS; SUBCUTANEOUS at 05:12

## 2020-12-23 RX ADMIN — ONDANSETRON 4 MG: 2 INJECTION INTRAMUSCULAR; INTRAVENOUS at 01:12

## 2020-12-23 RX ADMIN — SODIUM CHLORIDE: 0.9 INJECTION, SOLUTION INTRAVENOUS at 11:12

## 2020-12-23 RX ADMIN — SUGAMMADEX 400 MG: 100 INJECTION, SOLUTION INTRAVENOUS at 12:12

## 2020-12-23 RX ADMIN — SODIUM CHLORIDE 10 ML/HR: 0.9 INJECTION, SOLUTION INTRAVENOUS at 10:12

## 2020-12-23 RX ADMIN — ROCURONIUM BROMIDE 50 MG: 10 INJECTION, SOLUTION INTRAVENOUS at 11:12

## 2020-12-23 RX ADMIN — SODIUM CHLORIDE, SODIUM GLUCONATE, SODIUM ACETATE, POTASSIUM CHLORIDE, MAGNESIUM CHLORIDE, SODIUM PHOSPHATE, DIBASIC, AND POTASSIUM PHOSPHATE: .53; .5; .37; .037; .03; .012; .00082 INJECTION, SOLUTION INTRAVENOUS at 11:12

## 2020-12-23 RX ADMIN — HEPARIN SODIUM 5000 UNITS: 5000 INJECTION INTRAVENOUS; SUBCUTANEOUS at 10:12

## 2020-12-23 RX ADMIN — PROPOFOL 200 MG: 10 INJECTION, EMULSION INTRAVENOUS at 11:12

## 2020-12-23 RX ADMIN — METHOCARBAMOL 1000 MG: 100 INJECTION INTRAMUSCULAR; INTRAVENOUS at 02:12

## 2020-12-23 RX ADMIN — SODIUM CITRATE AND CITRIC ACID MONOHYDRATE 30 ML: 500; 334 SOLUTION ORAL at 10:12

## 2020-12-23 RX ADMIN — MUPIROCIN: 20 OINTMENT TOPICAL at 10:12

## 2020-12-23 RX ADMIN — GABAPENTIN 300 MG: 250 SOLUTION ORAL at 09:12

## 2020-12-23 RX ADMIN — LIDOCAINE HYDROCHLORIDE 2 MG: 10 INJECTION, SOLUTION EPIDURAL; INFILTRATION; INTRACAUDAL at 10:12

## 2020-12-23 RX ADMIN — ROCURONIUM BROMIDE 20 MG: 10 INJECTION, SOLUTION INTRAVENOUS at 11:12

## 2020-12-23 RX ADMIN — DIAZEPAM 2.5 MG: 10 INJECTION, SOLUTION INTRAMUSCULAR; INTRAVENOUS at 02:12

## 2020-12-23 RX ADMIN — HYDROMORPHONE HYDROCHLORIDE 0.2 MG: 1 INJECTION, SOLUTION INTRAMUSCULAR; INTRAVENOUS; SUBCUTANEOUS at 03:12

## 2020-12-23 RX ADMIN — LIDOCAINE HYDROCHLORIDE 100 MG: 20 INJECTION, SOLUTION EPIDURAL; INFILTRATION; INTRACAUDAL at 11:12

## 2020-12-23 RX ADMIN — ACETAMINOPHEN 499.51 MG: 160 SOLUTION ORAL at 09:12

## 2020-12-23 RX ADMIN — ENOXAPARIN SODIUM 40 MG: 40 INJECTION SUBCUTANEOUS at 09:12

## 2020-12-23 RX ADMIN — HYDROCODONE BITARTRATE AND ACETAMINOPHEN 15 ML: 7.5; 325 SOLUTION ORAL at 07:12

## 2020-12-23 RX ADMIN — SODIUM CHLORIDE, SODIUM LACTATE, POTASSIUM CHLORIDE, AND CALCIUM CHLORIDE: .6; .31; .03; .02 INJECTION, SOLUTION INTRAVENOUS at 12:12

## 2020-12-23 RX ADMIN — HYDROCODONE BITARTRATE AND ACETAMINOPHEN 15 ML: 7.5; 325 SOLUTION ORAL at 04:12

## 2020-12-23 RX ADMIN — HALOPERIDOL LACTATE 1 MG: 5 INJECTION, SOLUTION INTRAMUSCULAR at 01:12

## 2020-12-23 RX ADMIN — PANTOPRAZOLE SODIUM 40 MG: 40 INJECTION, POWDER, FOR SOLUTION INTRAVENOUS at 09:12

## 2020-12-23 RX ADMIN — CEFAZOLIN 3 G: 1 INJECTION, POWDER, FOR SOLUTION INTRAMUSCULAR; INTRAVENOUS at 11:12

## 2020-12-23 RX ADMIN — MIDAZOLAM 2 MG: 1 INJECTION INTRAMUSCULAR; INTRAVENOUS at 11:12

## 2020-12-23 NOTE — ANESTHESIA PROCEDURE NOTES
Intubation  Performed by: Zaheer Garner MD  Authorized by: Sharonda Simpson MD     Intubation:     Induction:  Intravenous    Intubated:  Postinduction    Mask Ventilation:  Moderately difficult with oral airway    Attempts:  1    Attempted By:  Resident anesthesiologist    Method of Intubation:  Direct    Blade:  Addison 2    Laryngeal View Grade: Grade IIA - cords partially seen      Difficult Airway Encountered?: No      Complications:  None    Airway Device:  Oral endotracheal tube    Airway Device Size:  8.0    Style/Cuff Inflation:  Cuffed    Inflation Amount (mL):  8    Tube secured:  24    Secured at:  The teeth    Placement Verified By:  Capnometry    Complicating Factors:  None    Findings Post-Intubation:  BS equal bilateral

## 2020-12-23 NOTE — ANESTHESIA POSTPROCEDURE EVALUATION
Anesthesia Post Evaluation    Patient: Cb Hernandez    Procedure(s) Performed: Procedure(s) (LRB):  GASTRECTOMY, SLEEVE, LAPAROSCOPIC, with intraop EGD (N/A)    Final Anesthesia Type: general      Patient location during evaluation: PACU  Patient participation: Yes- Able to Participate  Level of consciousness: awake and alert and oriented  Post-procedure vital signs: reviewed and stable  Pain management: adequate  Airway patency: patent    PONV status at discharge: No PONV  Anesthetic complications: no      Cardiovascular status: hemodynamically stable  Respiratory status: nasal cannula  Hydration status: euvolemic  Follow-up not needed.          Vitals Value Taken Time   /79 12/23/20 1533   Temp 36.5 °C (97.7 °F) 12/23/20 1545   Pulse 71 12/23/20 1602   Resp 14 12/23/20 1546   SpO2 97 % 12/23/20 1602   Vitals shown include unvalidated device data.      Event Time   Out of Recovery 14:45:00         Pain/Gautam Score: Pain Rating Prior to Med Admin: 5 (12/23/2020  3:10 PM)  Pain Rating Post Med Admin: 10 (12/23/2020  2:15 PM)  Gautam Score: 8 (12/23/2020  3:45 PM)

## 2020-12-23 NOTE — TRANSFER OF CARE
"Anesthesia Transfer of Care Note    Patient: Cb Hernandez    Procedure(s) Performed: Procedure(s) (LRB):  GASTRECTOMY, SLEEVE, LAPAROSCOPIC, with intraop EGD (N/A)    Patient location: PACU    Anesthesia Type: general    Transport from OR: Transported from OR on 6-10 L/min O2 by face mask with adequate spontaneous ventilation    Post pain: adequate analgesia    Post assessment: no apparent anesthetic complications    Post vital signs: stable    Level of consciousness: awake    Nausea/Vomiting: no nausea/vomiting    Complications: none    Transfer of care protocol was followed      Last vitals:   Visit Vitals  BP (!) 158/83 (BP Location: Left arm, Patient Position: Lying)   Pulse 60   Temp 36.8 °C (98.2 °F) (Temporal)   Resp 18   Ht 6' 3" (1.905 m)   Wt 130 kg (286 lb 9.6 oz)   SpO2 100%   BMI 35.82 kg/m²     "

## 2020-12-23 NOTE — PROGRESS NOTES
Sent an In Basket note to Dr. Canales to notify him that Mr. Hernandez' last K+ drawn 12-19-20 was 3.1.  WALKER Haro had ordered some K+ but Mr. Hernandez had not picked up the prescription.

## 2020-12-24 LAB
ANION GAP SERPL CALC-SCNC: 15 MMOL/L (ref 8–16)
BASOPHILS # BLD AUTO: 0.01 K/UL (ref 0–0.2)
BASOPHILS NFR BLD: 0.1 % (ref 0–1.9)
BUN SERPL-MCNC: 12 MG/DL (ref 6–20)
CALCIUM SERPL-MCNC: 9.3 MG/DL (ref 8.7–10.5)
CHLORIDE SERPL-SCNC: 100 MMOL/L (ref 95–110)
CO2 SERPL-SCNC: 26 MMOL/L (ref 23–29)
CREAT SERPL-MCNC: 1.1 MG/DL (ref 0.5–1.4)
DIFFERENTIAL METHOD: ABNORMAL
EOSINOPHIL # BLD AUTO: 0 K/UL (ref 0–0.5)
EOSINOPHIL NFR BLD: 0 % (ref 0–8)
ERYTHROCYTE [DISTWIDTH] IN BLOOD BY AUTOMATED COUNT: 14.9 % (ref 11.5–14.5)
EST. GFR  (AFRICAN AMERICAN): >60 ML/MIN/1.73 M^2
EST. GFR  (NON AFRICAN AMERICAN): >60 ML/MIN/1.73 M^2
GLUCOSE SERPL-MCNC: 102 MG/DL (ref 70–110)
HCT VFR BLD AUTO: 42.7 % (ref 40–54)
HGB BLD-MCNC: 14.2 G/DL (ref 14–18)
IMM GRANULOCYTES # BLD AUTO: 0.07 K/UL (ref 0–0.04)
IMM GRANULOCYTES NFR BLD AUTO: 0.6 % (ref 0–0.5)
LYMPHOCYTES # BLD AUTO: 0.9 K/UL (ref 1–4.8)
LYMPHOCYTES NFR BLD: 7.1 % (ref 18–48)
MAGNESIUM SERPL-MCNC: 1.7 MG/DL (ref 1.6–2.6)
MCH RBC QN AUTO: 30.7 PG (ref 27–31)
MCHC RBC AUTO-ENTMCNC: 33.3 G/DL (ref 32–36)
MCV RBC AUTO: 92 FL (ref 82–98)
MONOCYTES # BLD AUTO: 0.9 K/UL (ref 0.3–1)
MONOCYTES NFR BLD: 7.2 % (ref 4–15)
NEUTROPHILS # BLD AUTO: 10.7 K/UL (ref 1.8–7.7)
NEUTROPHILS NFR BLD: 85 % (ref 38–73)
NRBC BLD-RTO: 0 /100 WBC
PHOSPHATE SERPL-MCNC: 2.2 MG/DL (ref 2.7–4.5)
PLATELET # BLD AUTO: 243 K/UL (ref 150–350)
PMV BLD AUTO: 9.2 FL (ref 9.2–12.9)
POTASSIUM SERPL-SCNC: 3.4 MMOL/L (ref 3.5–5.1)
RBC # BLD AUTO: 4.63 M/UL (ref 4.6–6.2)
SODIUM SERPL-SCNC: 141 MMOL/L (ref 136–145)
WBC # BLD AUTO: 12.57 K/UL (ref 3.9–12.7)

## 2020-12-24 PROCEDURE — 83735 ASSAY OF MAGNESIUM: CPT

## 2020-12-24 PROCEDURE — 63600175 PHARM REV CODE 636 W HCPCS: Performed by: STUDENT IN AN ORGANIZED HEALTH CARE EDUCATION/TRAINING PROGRAM

## 2020-12-24 PROCEDURE — 25000003 PHARM REV CODE 250: Performed by: STUDENT IN AN ORGANIZED HEALTH CARE EDUCATION/TRAINING PROGRAM

## 2020-12-24 PROCEDURE — 84100 ASSAY OF PHOSPHORUS: CPT

## 2020-12-24 PROCEDURE — 36415 COLL VENOUS BLD VENIPUNCTURE: CPT

## 2020-12-24 PROCEDURE — 85025 COMPLETE CBC W/AUTO DIFF WBC: CPT

## 2020-12-24 PROCEDURE — 80048 BASIC METABOLIC PNL TOTAL CA: CPT

## 2020-12-24 PROCEDURE — 11000001 HC ACUTE MED/SURG PRIVATE ROOM

## 2020-12-24 PROCEDURE — C9113 INJ PANTOPRAZOLE SODIUM, VIA: HCPCS | Performed by: STUDENT IN AN ORGANIZED HEALTH CARE EDUCATION/TRAINING PROGRAM

## 2020-12-24 RX ORDER — CLOPIDOGREL BISULFATE 75 MG/1
75 TABLET ORAL DAILY
Status: DISCONTINUED | OUTPATIENT
Start: 2020-12-24 | End: 2020-12-25 | Stop reason: HOSPADM

## 2020-12-24 RX ORDER — CYCLOBENZAPRINE HCL 5 MG
5 TABLET ORAL 3 TIMES DAILY
Status: DISCONTINUED | OUTPATIENT
Start: 2020-12-24 | End: 2020-12-25 | Stop reason: HOSPADM

## 2020-12-24 RX ORDER — METOCLOPRAMIDE HYDROCHLORIDE 5 MG/ML
10 INJECTION INTRAMUSCULAR; INTRAVENOUS ONCE
Status: COMPLETED | OUTPATIENT
Start: 2020-12-24 | End: 2020-12-24

## 2020-12-24 RX ORDER — KETOROLAC TROMETHAMINE 15 MG/ML
15 INJECTION, SOLUTION INTRAMUSCULAR; INTRAVENOUS EVERY 6 HOURS
Status: DISCONTINUED | OUTPATIENT
Start: 2020-12-24 | End: 2020-12-25 | Stop reason: HOSPADM

## 2020-12-24 RX ORDER — HYDROMORPHONE HYDROCHLORIDE 1 MG/ML
1 INJECTION, SOLUTION INTRAMUSCULAR; INTRAVENOUS; SUBCUTANEOUS
Status: DISCONTINUED | OUTPATIENT
Start: 2020-12-24 | End: 2020-12-25 | Stop reason: HOSPADM

## 2020-12-24 RX ORDER — LABETALOL HCL 20 MG/4 ML
10 SYRINGE (ML) INTRAVENOUS ONCE
Status: COMPLETED | OUTPATIENT
Start: 2020-12-24 | End: 2020-12-24

## 2020-12-24 RX ORDER — CYCLOBENZAPRINE HCL 5 MG
5 TABLET ORAL 3 TIMES DAILY PRN
Status: DISCONTINUED | OUTPATIENT
Start: 2020-12-24 | End: 2020-12-24

## 2020-12-24 RX ADMIN — GABAPENTIN 300 MG: 250 SOLUTION ORAL at 11:12

## 2020-12-24 RX ADMIN — OLMESARTAN MEDOXOMIL 40 MG: 20 TABLET ORAL at 07:12

## 2020-12-24 RX ADMIN — HYDRALAZINE HYDROCHLORIDE 25 MG: 25 TABLET, FILM COATED ORAL at 09:12

## 2020-12-24 RX ADMIN — PANTOPRAZOLE SODIUM 40 MG: 40 INJECTION, POWDER, FOR SOLUTION INTRAVENOUS at 08:12

## 2020-12-24 RX ADMIN — ENOXAPARIN SODIUM 40 MG: 40 INJECTION SUBCUTANEOUS at 04:12

## 2020-12-24 RX ADMIN — HYDROMORPHONE HYDROCHLORIDE 0.5 MG: 1 INJECTION, SOLUTION INTRAMUSCULAR; INTRAVENOUS; SUBCUTANEOUS at 01:12

## 2020-12-24 RX ADMIN — ONDANSETRON 8 MG: 2 INJECTION INTRAMUSCULAR; INTRAVENOUS at 06:12

## 2020-12-24 RX ADMIN — CARVEDILOL 25 MG: 25 TABLET, FILM COATED ORAL at 08:12

## 2020-12-24 RX ADMIN — ACETAMINOPHEN 499.51 MG: 160 SOLUTION ORAL at 05:12

## 2020-12-24 RX ADMIN — HYDROCODONE BITARTRATE AND ACETAMINOPHEN 15 ML: 7.5; 325 SOLUTION ORAL at 08:12

## 2020-12-24 RX ADMIN — PANTOPRAZOLE SODIUM 40 MG: 40 INJECTION, POWDER, FOR SOLUTION INTRAVENOUS at 09:12

## 2020-12-24 RX ADMIN — ASPIRIN 81 MG: 81 TABLET, COATED ORAL at 08:12

## 2020-12-24 RX ADMIN — CARVEDILOL 25 MG: 25 TABLET, FILM COATED ORAL at 09:12

## 2020-12-24 RX ADMIN — HYDROCODONE BITARTRATE AND ACETAMINOPHEN 15 ML: 7.5; 325 SOLUTION ORAL at 03:12

## 2020-12-24 RX ADMIN — ATORVASTATIN CALCIUM 40 MG: 20 TABLET, FILM COATED ORAL at 07:12

## 2020-12-24 RX ADMIN — KETOROLAC TROMETHAMINE 15 MG: 15 INJECTION, SOLUTION INTRAMUSCULAR; INTRAVENOUS at 02:12

## 2020-12-24 RX ADMIN — HYDRALAZINE HYDROCHLORIDE 25 MG: 25 TABLET, FILM COATED ORAL at 08:12

## 2020-12-24 RX ADMIN — METOCLOPRAMIDE 10 MG: 5 INJECTION, SOLUTION INTRAMUSCULAR; INTRAVENOUS at 02:12

## 2020-12-24 RX ADMIN — SODIUM CHLORIDE, SODIUM LACTATE, POTASSIUM CHLORIDE, AND CALCIUM CHLORIDE: .6; .31; .03; .02 INJECTION, SOLUTION INTRAVENOUS at 03:12

## 2020-12-24 RX ADMIN — HYDROCODONE BITARTRATE AND ACETAMINOPHEN 15 ML: 7.5; 325 SOLUTION ORAL at 02:12

## 2020-12-24 RX ADMIN — HYDROCODONE BITARTRATE AND ACETAMINOPHEN 15 ML: 7.5; 325 SOLUTION ORAL at 11:12

## 2020-12-24 RX ADMIN — Medication 10 MG: at 01:12

## 2020-12-24 RX ADMIN — CYCLOBENZAPRINE HYDROCHLORIDE 5 MG: 5 TABLET, FILM COATED ORAL at 02:12

## 2020-12-24 RX ADMIN — GABAPENTIN 300 MG: 250 SOLUTION ORAL at 02:12

## 2020-12-24 RX ADMIN — HYDROMORPHONE HYDROCHLORIDE 0.5 MG: 1 INJECTION, SOLUTION INTRAMUSCULAR; INTRAVENOUS; SUBCUTANEOUS at 07:12

## 2020-12-24 RX ADMIN — HYDROMORPHONE HYDROCHLORIDE 1 MG: 1 INJECTION, SOLUTION INTRAMUSCULAR; INTRAVENOUS; SUBCUTANEOUS at 10:12

## 2020-12-24 RX ADMIN — CYCLOBENZAPRINE HYDROCHLORIDE 5 MG: 5 TABLET, FILM COATED ORAL at 09:12

## 2020-12-24 RX ADMIN — HYDROMORPHONE HYDROCHLORIDE 1 MG: 1 INJECTION, SOLUTION INTRAMUSCULAR; INTRAVENOUS; SUBCUTANEOUS at 06:12

## 2020-12-24 RX ADMIN — ESCITALOPRAM OXALATE 20 MG: 20 TABLET ORAL at 07:12

## 2020-12-24 RX ADMIN — KETOROLAC TROMETHAMINE 15 MG: 15 INJECTION, SOLUTION INTRAMUSCULAR; INTRAVENOUS at 11:12

## 2020-12-24 RX ADMIN — HYDROMORPHONE HYDROCHLORIDE 0.5 MG: 1 INJECTION, SOLUTION INTRAMUSCULAR; INTRAVENOUS; SUBCUTANEOUS at 04:12

## 2020-12-24 RX ADMIN — ONDANSETRON 8 MG: 2 INJECTION INTRAMUSCULAR; INTRAVENOUS at 08:12

## 2020-12-24 RX ADMIN — GABAPENTIN 300 MG: 250 SOLUTION ORAL at 09:12

## 2020-12-24 RX ADMIN — CLOPIDOGREL 75 MG: 75 TABLET, FILM COATED ORAL at 08:12

## 2020-12-25 VITALS
TEMPERATURE: 99 F | OXYGEN SATURATION: 92 % | HEART RATE: 72 BPM | HEIGHT: 75 IN | DIASTOLIC BLOOD PRESSURE: 91 MMHG | BODY MASS INDEX: 35.64 KG/M2 | SYSTOLIC BLOOD PRESSURE: 164 MMHG | RESPIRATION RATE: 18 BRPM | WEIGHT: 286.63 LBS

## 2020-12-25 LAB
ANION GAP SERPL CALC-SCNC: 10 MMOL/L (ref 8–16)
BASOPHILS # BLD AUTO: 0.02 K/UL (ref 0–0.2)
BASOPHILS NFR BLD: 0.2 % (ref 0–1.9)
BUN SERPL-MCNC: 12 MG/DL (ref 6–20)
CALCIUM SERPL-MCNC: 9.1 MG/DL (ref 8.7–10.5)
CHLORIDE SERPL-SCNC: 102 MMOL/L (ref 95–110)
CO2 SERPL-SCNC: 30 MMOL/L (ref 23–29)
CREAT SERPL-MCNC: 1.3 MG/DL (ref 0.5–1.4)
DIFFERENTIAL METHOD: ABNORMAL
EOSINOPHIL # BLD AUTO: 0 K/UL (ref 0–0.5)
EOSINOPHIL NFR BLD: 0.3 % (ref 0–8)
ERYTHROCYTE [DISTWIDTH] IN BLOOD BY AUTOMATED COUNT: 15.1 % (ref 11.5–14.5)
EST. GFR  (AFRICAN AMERICAN): >60 ML/MIN/1.73 M^2
EST. GFR  (NON AFRICAN AMERICAN): >60 ML/MIN/1.73 M^2
GLUCOSE SERPL-MCNC: 80 MG/DL (ref 70–110)
HCT VFR BLD AUTO: 40.9 % (ref 40–54)
HGB BLD-MCNC: 13.4 G/DL (ref 14–18)
IMM GRANULOCYTES # BLD AUTO: 0.04 K/UL (ref 0–0.04)
IMM GRANULOCYTES NFR BLD AUTO: 0.5 % (ref 0–0.5)
LYMPHOCYTES # BLD AUTO: 2.1 K/UL (ref 1–4.8)
LYMPHOCYTES NFR BLD: 24.2 % (ref 18–48)
MAGNESIUM SERPL-MCNC: 1.9 MG/DL (ref 1.6–2.6)
MCH RBC QN AUTO: 30 PG (ref 27–31)
MCHC RBC AUTO-ENTMCNC: 32.8 G/DL (ref 32–36)
MCV RBC AUTO: 92 FL (ref 82–98)
MONOCYTES # BLD AUTO: 0.9 K/UL (ref 0.3–1)
MONOCYTES NFR BLD: 10.9 % (ref 4–15)
NEUTROPHILS # BLD AUTO: 5.5 K/UL (ref 1.8–7.7)
NEUTROPHILS NFR BLD: 63.9 % (ref 38–73)
NRBC BLD-RTO: 0 /100 WBC
PHOSPHATE SERPL-MCNC: 2.5 MG/DL (ref 2.7–4.5)
PLATELET # BLD AUTO: 227 K/UL (ref 150–350)
PMV BLD AUTO: 9.1 FL (ref 9.2–12.9)
POTASSIUM SERPL-SCNC: 3.4 MMOL/L (ref 3.5–5.1)
RBC # BLD AUTO: 4.47 M/UL (ref 4.6–6.2)
SODIUM SERPL-SCNC: 142 MMOL/L (ref 136–145)
WBC # BLD AUTO: 8.63 K/UL (ref 3.9–12.7)

## 2020-12-25 PROCEDURE — 85025 COMPLETE CBC W/AUTO DIFF WBC: CPT

## 2020-12-25 PROCEDURE — 83735 ASSAY OF MAGNESIUM: CPT

## 2020-12-25 PROCEDURE — 25000003 PHARM REV CODE 250: Performed by: STUDENT IN AN ORGANIZED HEALTH CARE EDUCATION/TRAINING PROGRAM

## 2020-12-25 PROCEDURE — C9113 INJ PANTOPRAZOLE SODIUM, VIA: HCPCS | Performed by: STUDENT IN AN ORGANIZED HEALTH CARE EDUCATION/TRAINING PROGRAM

## 2020-12-25 PROCEDURE — 84100 ASSAY OF PHOSPHORUS: CPT

## 2020-12-25 PROCEDURE — 36415 COLL VENOUS BLD VENIPUNCTURE: CPT

## 2020-12-25 PROCEDURE — 63600175 PHARM REV CODE 636 W HCPCS: Performed by: STUDENT IN AN ORGANIZED HEALTH CARE EDUCATION/TRAINING PROGRAM

## 2020-12-25 PROCEDURE — 80048 BASIC METABOLIC PNL TOTAL CA: CPT

## 2020-12-25 RX ADMIN — OLMESARTAN MEDOXOMIL 40 MG: 20 TABLET ORAL at 06:12

## 2020-12-25 RX ADMIN — CLOPIDOGREL 75 MG: 75 TABLET, FILM COATED ORAL at 09:12

## 2020-12-25 RX ADMIN — ESCITALOPRAM OXALATE 20 MG: 20 TABLET ORAL at 06:12

## 2020-12-25 RX ADMIN — GABAPENTIN 300 MG: 250 SOLUTION ORAL at 09:12

## 2020-12-25 RX ADMIN — HYDRALAZINE HYDROCHLORIDE 25 MG: 25 TABLET, FILM COATED ORAL at 09:12

## 2020-12-25 RX ADMIN — ATORVASTATIN CALCIUM 40 MG: 20 TABLET, FILM COATED ORAL at 06:12

## 2020-12-25 RX ADMIN — ASPIRIN 81 MG: 81 TABLET, COATED ORAL at 09:12

## 2020-12-25 RX ADMIN — HYDROCODONE BITARTRATE AND ACETAMINOPHEN 15 ML: 7.5; 325 SOLUTION ORAL at 04:12

## 2020-12-25 RX ADMIN — BUPROPION HYDROCHLORIDE 150 MG: 150 TABLET, FILM COATED, EXTENDED RELEASE ORAL at 06:12

## 2020-12-25 RX ADMIN — CARVEDILOL 25 MG: 25 TABLET, FILM COATED ORAL at 09:12

## 2020-12-25 RX ADMIN — KETOROLAC TROMETHAMINE 15 MG: 15 INJECTION, SOLUTION INTRAMUSCULAR; INTRAVENOUS at 06:12

## 2020-12-25 RX ADMIN — CYCLOBENZAPRINE HYDROCHLORIDE 5 MG: 5 TABLET, FILM COATED ORAL at 09:12

## 2020-12-25 RX ADMIN — PANTOPRAZOLE SODIUM 40 MG: 40 INJECTION, POWDER, FOR SOLUTION INTRAVENOUS at 09:12

## 2020-12-28 ENCOUNTER — PATIENT MESSAGE (OUTPATIENT)
Dept: BARIATRICS | Facility: CLINIC | Age: 55
End: 2020-12-28

## 2020-12-30 ENCOUNTER — CLINICAL SUPPORT (OUTPATIENT)
Dept: BARIATRICS | Facility: CLINIC | Age: 55
End: 2020-12-30
Payer: COMMERCIAL

## 2020-12-30 ENCOUNTER — PATIENT MESSAGE (OUTPATIENT)
Dept: BARIATRICS | Facility: CLINIC | Age: 55
End: 2020-12-30

## 2020-12-30 PROCEDURE — 99499 NO LOS: ICD-10-PCS | Mod: 95,,, | Performed by: DIETITIAN, REGISTERED

## 2020-12-30 PROCEDURE — 99499 UNLISTED E&M SERVICE: CPT | Mod: 95,,, | Performed by: DIETITIAN, REGISTERED

## 2020-12-30 NOTE — PROGRESS NOTES
The patient location is: home ([place])  The chief complaint leading to consultation is: s/p bariatric sx    Visit type: audiovisual    Face to Face time with patient: 15 min  15 minutes of total time spent on the encounter, which includes face to face time and non-face to face time preparing to see the patient (eg, review of tests), Obtaining and/or reviewing separately obtained history, Documenting clinical information in the electronic or other health record, Independently interpreting results (not separately reported) and communicating results to the patient/family/caregiver, or Care coordination (not separately reported).         Each patient to whom he or she provides medical services by telemedicine is:  (1) informed of the relationship between the physician and patient and the respective role of any other health care provider with respect to management of the patient; and (2) notified that he or she may decline to receive medical services by telemedicine and may withdraw from such care at any time.    Nutrition Note  1 week post op from bariatric surgery - Sleeve.    Dehydration assessment:  Urine output/color: good  Chest pain:n  Persistent increased heart rate:n  Fatigue:n  N/V: n  Dizzy/weak: n  BM: y  Protein and fluid intake assessment: (food diary)  Fluid intake: 48floz water + 24floz CL + 3-4 sf popsicles + bowl of broth  Protein supplements: whey powder or lean 25 or unjury powders + water/CL. Occ protein velvet ice from [employer].  Protein intake yesterday: 60g  Vitamins  -What vitamins are you taking? Centrum tablets crushed, Ca Citrate + D 1/day (2 tablets = 630mg), B1 25mg 1/day, Super B complex with 100mg, B12 sub 5000mcg daily  -Are you tolerating well?  Medications  Omeprazole: y  Hycet:n  How are you tolerating pain at this time? 1 (rate on a scale from 1 to 10; >7 notify PA/MD)  Are you having any problems? n (f/u with PCP, cardiologist, endocrinologist)  How is your support system at home?  good  Exercise reminder (light exercise at this time, no lifting above 10 lbs)      Questions for nurse/MA/PA: no     Assessment:  Doing well.     Discussion:  Continue to work on fluid and protein intake.  Increase Centrum to twice/day. Increase Ca to 2 tablets, twice/day. Increase B1 25mg to twice/day. D/c super b-complex. Decrease B12 sublingual to twice/month. Sent detailed vit/min schedule in my [facility] msg.     Confirmed date and time for 2 week po labs and clinic visit

## 2021-01-04 NOTE — PROGRESS NOTES
MD Rose Marie Joshi RN; Marlene Bender, LACI; Sabi Munoz, LACI             OK.  We can move forward.  It appears they have given us the green light.    Previous Messages    ----- Message -----   From: Rose Marie Sellers RN   Sent: 10/28/2020  10:58 AM CDT   To: MD Dr. Parker Joshi,  This is the revised Cardiology Clearance and EKG for Cb Hernandez MRN [id number].

## 2021-01-05 LAB
FINAL PATHOLOGIC DIAGNOSIS: NORMAL
Lab: NORMAL

## 2021-01-06 ENCOUNTER — CLINICAL SUPPORT (OUTPATIENT)
Dept: BARIATRICS | Facility: CLINIC | Age: 56
End: 2021-01-06

## 2021-01-06 ENCOUNTER — OFFICE VISIT (OUTPATIENT)
Dept: BARIATRICS | Facility: CLINIC | Age: 56
End: 2021-01-06
Payer: COMMERCIAL

## 2021-01-06 ENCOUNTER — PATIENT MESSAGE (OUTPATIENT)
Dept: BARIATRICS | Facility: CLINIC | Age: 56
End: 2021-01-06

## 2021-01-06 ENCOUNTER — LAB VISIT (OUTPATIENT)
Dept: LAB | Facility: HOSPITAL | Age: 56
End: 2021-01-06
Attending: PHYSICIAN ASSISTANT
Payer: COMMERCIAL

## 2021-01-06 VITALS — WEIGHT: 285 LBS | HEIGHT: 75 IN | BODY MASS INDEX: 35.43 KG/M2

## 2021-01-06 DIAGNOSIS — E78.5 HYPERLIPIDEMIA, UNSPECIFIED HYPERLIPIDEMIA TYPE: ICD-10-CM

## 2021-01-06 DIAGNOSIS — I10 ESSENTIAL HYPERTENSION: ICD-10-CM

## 2021-01-06 DIAGNOSIS — M10.9 GOUT, UNSPECIFIED CAUSE, UNSPECIFIED CHRONICITY, UNSPECIFIED SITE: ICD-10-CM

## 2021-01-06 DIAGNOSIS — I10 HYPERTENSION, UNSPECIFIED TYPE: ICD-10-CM

## 2021-01-06 DIAGNOSIS — Z98.84 S/P LAPAROSCOPIC SLEEVE GASTRECTOMY: ICD-10-CM

## 2021-01-06 DIAGNOSIS — E66.9 OBESITY, UNSPECIFIED CLASSIFICATION, UNSPECIFIED OBESITY TYPE, UNSPECIFIED WHETHER SERIOUS COMORBIDITY PRESENT: ICD-10-CM

## 2021-01-06 DIAGNOSIS — Z98.890 POST-OPERATIVE STATE: Primary | ICD-10-CM

## 2021-01-06 DIAGNOSIS — E87.6 LOW SERUM POTASSIUM LEVEL: ICD-10-CM

## 2021-01-06 DIAGNOSIS — I25.10 CORONARY ARTERY DISEASE, ANGINA PRESENCE UNSPECIFIED, UNSPECIFIED VESSEL OR LESION TYPE, UNSPECIFIED WHETHER NATIVE OR TRANSPLANTED HEART: ICD-10-CM

## 2021-01-06 DIAGNOSIS — E66.9 OBESITY (BMI 30-39.9): ICD-10-CM

## 2021-01-06 DIAGNOSIS — R63.4 WEIGHT LOSS: ICD-10-CM

## 2021-01-06 PROBLEM — F32.A DEPRESSION: Status: ACTIVE | Noted: 2021-01-06

## 2021-01-06 PROBLEM — R73.01 IMPAIRED FASTING GLUCOSE: Status: ACTIVE | Noted: 2021-01-06

## 2021-01-06 PROBLEM — M17.0 ARTHRITIS OF BOTH KNEES: Status: ACTIVE | Noted: 2021-01-06

## 2021-01-06 PROBLEM — M51.16 RADICULOPATHY DUE TO LUMBAR INTERVERTEBRAL DISC DISORDER: Status: ACTIVE | Noted: 2021-01-06

## 2021-01-06 PROBLEM — E55.9 VITAMIN D DEFICIENCY: Status: ACTIVE | Noted: 2021-01-06

## 2021-01-06 LAB
ALBUMIN SERPL BCP-MCNC: 3.9 G/DL (ref 3.5–5.2)
ALP SERPL-CCNC: 67 U/L (ref 55–135)
ALT SERPL W/O P-5'-P-CCNC: 26 U/L (ref 10–44)
ANION GAP SERPL CALC-SCNC: 13 MMOL/L (ref 8–16)
AST SERPL-CCNC: 23 U/L (ref 10–40)
BASOPHILS # BLD AUTO: 0.05 K/UL (ref 0–0.2)
BASOPHILS NFR BLD: 0.7 % (ref 0–1.9)
BILIRUB SERPL-MCNC: 0.6 MG/DL (ref 0.1–1)
BUN SERPL-MCNC: 13 MG/DL (ref 6–20)
CALCIUM SERPL-MCNC: 9.4 MG/DL (ref 8.7–10.5)
CHLORIDE SERPL-SCNC: 101 MMOL/L (ref 95–110)
CO2 SERPL-SCNC: 29 MMOL/L (ref 23–29)
CREAT SERPL-MCNC: 1.6 MG/DL (ref 0.5–1.4)
DIFFERENTIAL METHOD: ABNORMAL
EOSINOPHIL # BLD AUTO: 0 K/UL (ref 0–0.5)
EOSINOPHIL NFR BLD: 0.1 % (ref 0–8)
ERYTHROCYTE [DISTWIDTH] IN BLOOD BY AUTOMATED COUNT: 14.6 % (ref 11.5–14.5)
EST. GFR  (AFRICAN AMERICAN): 54.5 ML/MIN/1.73 M^2
EST. GFR  (NON AFRICAN AMERICAN): 47.1 ML/MIN/1.73 M^2
GLUCOSE SERPL-MCNC: 82 MG/DL (ref 70–110)
HCT VFR BLD AUTO: 41.5 % (ref 40–54)
HGB BLD-MCNC: 13.4 G/DL (ref 14–18)
IMM GRANULOCYTES # BLD AUTO: 0.02 K/UL (ref 0–0.04)
IMM GRANULOCYTES NFR BLD AUTO: 0.3 % (ref 0–0.5)
LYMPHOCYTES # BLD AUTO: 1.7 K/UL (ref 1–4.8)
LYMPHOCYTES NFR BLD: 24.7 % (ref 18–48)
MCH RBC QN AUTO: 30 PG (ref 27–31)
MCHC RBC AUTO-ENTMCNC: 32.3 G/DL (ref 32–36)
MCV RBC AUTO: 93 FL (ref 82–98)
MONOCYTES # BLD AUTO: 0.8 K/UL (ref 0.3–1)
MONOCYTES NFR BLD: 11.3 % (ref 4–15)
NEUTROPHILS # BLD AUTO: 4.4 K/UL (ref 1.8–7.7)
NEUTROPHILS NFR BLD: 62.9 % (ref 38–73)
NRBC BLD-RTO: 0 /100 WBC
PLATELET # BLD AUTO: 312 K/UL (ref 150–350)
PMV BLD AUTO: 9.5 FL (ref 9.2–12.9)
POTASSIUM SERPL-SCNC: 3.2 MMOL/L (ref 3.5–5.1)
PROT SERPL-MCNC: 7.1 G/DL (ref 6–8.4)
RBC # BLD AUTO: 4.47 M/UL (ref 4.6–6.2)
SODIUM SERPL-SCNC: 143 MMOL/L (ref 136–145)
VIT B12 SERPL-MCNC: >2000 PG/ML (ref 210–950)
WBC # BLD AUTO: 6.99 K/UL (ref 3.9–12.7)

## 2021-01-06 PROCEDURE — 99499 UNLISTED E&M SERVICE: CPT | Mod: 95,,, | Performed by: DIETITIAN, REGISTERED

## 2021-01-06 PROCEDURE — 99024 POSTOP FOLLOW-UP VISIT: CPT | Mod: 95,,, | Performed by: PHYSICIAN ASSISTANT

## 2021-01-06 PROCEDURE — 36415 COLL VENOUS BLD VENIPUNCTURE: CPT

## 2021-01-06 PROCEDURE — 80053 COMPREHEN METABOLIC PANEL: CPT

## 2021-01-06 PROCEDURE — 85025 COMPLETE CBC W/AUTO DIFF WBC: CPT

## 2021-01-06 PROCEDURE — 99024 PR POST-OP FOLLOW-UP VISIT: ICD-10-PCS | Mod: 95,,, | Performed by: PHYSICIAN ASSISTANT

## 2021-01-06 PROCEDURE — 82607 VITAMIN B-12: CPT

## 2021-01-06 PROCEDURE — 99499 NO LOS: ICD-10-PCS | Mod: 95,,, | Performed by: DIETITIAN, REGISTERED

## 2021-01-06 PROCEDURE — 84425 ASSAY OF VITAMIN B-1: CPT

## 2021-01-06 RX ORDER — OMEPRAZOLE 40 MG/1
40 CAPSULE, DELAYED RELEASE ORAL EVERY MORNING
Qty: 30 CAPSULE | Refills: 2 | Status: SHIPPED | OUTPATIENT
Start: 2021-01-06

## 2021-01-06 RX ORDER — URSODIOL 500 MG/1
TABLET, FILM COATED ORAL
Qty: 90 TABLET | Refills: 1 | Status: SHIPPED | OUTPATIENT
Start: 2021-01-06

## 2021-01-06 RX ORDER — POLYETHYLENE GLYCOL 3350 17 G/17G
17 POWDER, FOR SOLUTION ORAL DAILY
Qty: 510 G | Refills: 3 | Status: SHIPPED | OUTPATIENT
Start: 2021-01-06

## 2021-01-06 RX ORDER — ALLOPURINOL 100 MG/1
TABLET ORAL
COMMUNITY

## 2021-01-07 ENCOUNTER — PATIENT MESSAGE (OUTPATIENT)
Dept: BARIATRICS | Facility: CLINIC | Age: 56
End: 2021-01-07

## 2021-01-07 DIAGNOSIS — E87.6 HYPOKALEMIA: Primary | ICD-10-CM

## 2021-01-07 RX ORDER — POTASSIUM CHLORIDE 20 MEQ/1
20 TABLET, EXTENDED RELEASE ORAL 2 TIMES DAILY
Qty: 6 TABLET | Refills: 0 | Status: SHIPPED | OUTPATIENT
Start: 2021-01-07 | End: 2021-01-10

## 2021-01-07 RX ORDER — ONDANSETRON 8 MG/1
8 TABLET, ORALLY DISINTEGRATING ORAL EVERY 6 HOURS PRN
Qty: 30 TABLET | Refills: 0 | Status: SHIPPED | OUTPATIENT
Start: 2021-01-07

## 2021-01-11 LAB — VIT B1 BLD-MCNC: 88 UG/L (ref 38–122)

## 2021-01-12 ENCOUNTER — PATIENT MESSAGE (OUTPATIENT)
Dept: BARIATRICS | Facility: CLINIC | Age: 56
End: 2021-01-12

## 2021-01-12 DIAGNOSIS — Z98.890 DIARRHEA FOLLOWING GASTROINTESTINAL SURGERY: ICD-10-CM

## 2021-01-12 DIAGNOSIS — R19.7 DIARRHEA FOLLOWING GASTROINTESTINAL SURGERY: ICD-10-CM

## 2021-01-12 DIAGNOSIS — Z98.84 S/P LAPAROSCOPIC SLEEVE GASTRECTOMY: Primary | ICD-10-CM

## 2021-01-15 ENCOUNTER — PATIENT MESSAGE (OUTPATIENT)
Dept: BARIATRICS | Facility: CLINIC | Age: 56
End: 2021-01-15

## 2021-01-27 ENCOUNTER — TELEPHONE (OUTPATIENT)
Dept: BARIATRICS | Facility: CLINIC | Age: 56
End: 2021-01-27

## 2021-02-03 ENCOUNTER — PATIENT MESSAGE (OUTPATIENT)
Dept: BARIATRICS | Facility: CLINIC | Age: 56
End: 2021-02-03

## 2021-02-08 ENCOUNTER — LAB VISIT (OUTPATIENT)
Dept: LAB | Facility: HOSPITAL | Age: 56
End: 2021-02-08
Attending: PHYSICIAN ASSISTANT
Payer: COMMERCIAL

## 2021-02-08 DIAGNOSIS — I25.10 CORONARY ARTERY DISEASE, ANGINA PRESENCE UNSPECIFIED, UNSPECIFIED VESSEL OR LESION TYPE, UNSPECIFIED WHETHER NATIVE OR TRANSPLANTED HEART: ICD-10-CM

## 2021-02-08 DIAGNOSIS — E87.6 HYPOKALEMIA: ICD-10-CM

## 2021-02-08 DIAGNOSIS — I10 ESSENTIAL HYPERTENSION: ICD-10-CM

## 2021-02-08 DIAGNOSIS — E66.9 OBESITY (BMI 30-39.9): ICD-10-CM

## 2021-02-08 DIAGNOSIS — E87.6 LOW SERUM POTASSIUM LEVEL: ICD-10-CM

## 2021-02-08 DIAGNOSIS — E66.9 OBESITY, UNSPECIFIED CLASSIFICATION, UNSPECIFIED OBESITY TYPE, UNSPECIFIED WHETHER SERIOUS COMORBIDITY PRESENT: ICD-10-CM

## 2021-02-08 DIAGNOSIS — I10 HYPERTENSION, UNSPECIFIED TYPE: ICD-10-CM

## 2021-02-08 LAB
25(OH)D3+25(OH)D2 SERPL-MCNC: 43 NG/ML (ref 30–96)
ALBUMIN SERPL BCP-MCNC: 3.8 G/DL (ref 3.5–5.2)
ALP SERPL-CCNC: 68 U/L (ref 55–135)
ALT SERPL W/O P-5'-P-CCNC: 23 U/L (ref 10–44)
ANION GAP SERPL CALC-SCNC: 11 MMOL/L (ref 8–16)
AST SERPL-CCNC: 20 U/L (ref 10–40)
BASOPHILS # BLD AUTO: 0.05 K/UL (ref 0–0.2)
BASOPHILS NFR BLD: 0.7 % (ref 0–1.9)
BILIRUB SERPL-MCNC: 0.6 MG/DL (ref 0.1–1)
BUN SERPL-MCNC: 21 MG/DL (ref 6–20)
CALCIUM SERPL-MCNC: 9.3 MG/DL (ref 8.7–10.5)
CHLORIDE SERPL-SCNC: 103 MMOL/L (ref 95–110)
CHOLEST SERPL-MCNC: 109 MG/DL (ref 120–199)
CHOLEST/HDLC SERPL: 3 {RATIO} (ref 2–5)
CO2 SERPL-SCNC: 28 MMOL/L (ref 23–29)
CREAT SERPL-MCNC: 1.6 MG/DL (ref 0.5–1.4)
DIFFERENTIAL METHOD: ABNORMAL
EOSINOPHIL # BLD AUTO: 0.1 K/UL (ref 0–0.5)
EOSINOPHIL NFR BLD: 1.5 % (ref 0–8)
ERYTHROCYTE [DISTWIDTH] IN BLOOD BY AUTOMATED COUNT: 14.9 % (ref 11.5–14.5)
EST. GFR  (AFRICAN AMERICAN): 54.1 ML/MIN/1.73 M^2
EST. GFR  (NON AFRICAN AMERICAN): 46.8 ML/MIN/1.73 M^2
GLUCOSE SERPL-MCNC: 91 MG/DL (ref 70–110)
HCT VFR BLD AUTO: 41.8 % (ref 40–54)
HDLC SERPL-MCNC: 36 MG/DL (ref 40–75)
HDLC SERPL: 33 % (ref 20–50)
HGB BLD-MCNC: 14.1 G/DL (ref 14–18)
IMM GRANULOCYTES # BLD AUTO: 0.02 K/UL (ref 0–0.04)
IMM GRANULOCYTES NFR BLD AUTO: 0.3 % (ref 0–0.5)
IRON SERPL-MCNC: 61 UG/DL (ref 45–160)
LDLC SERPL CALC-MCNC: 58 MG/DL (ref 63–159)
LYMPHOCYTES # BLD AUTO: 1.2 K/UL (ref 1–4.8)
LYMPHOCYTES NFR BLD: 16.4 % (ref 18–48)
MCH RBC QN AUTO: 30 PG (ref 27–31)
MCHC RBC AUTO-ENTMCNC: 33.7 G/DL (ref 32–36)
MCV RBC AUTO: 89 FL (ref 82–98)
MONOCYTES # BLD AUTO: 0.6 K/UL (ref 0.3–1)
MONOCYTES NFR BLD: 8.5 % (ref 4–15)
NEUTROPHILS # BLD AUTO: 5.4 K/UL (ref 1.8–7.7)
NEUTROPHILS NFR BLD: 72.6 % (ref 38–73)
NONHDLC SERPL-MCNC: 73 MG/DL
NRBC BLD-RTO: 0 /100 WBC
PLATELET # BLD AUTO: 265 K/UL (ref 150–350)
PMV BLD AUTO: 10.1 FL (ref 9.2–12.9)
POTASSIUM SERPL-SCNC: 3 MMOL/L (ref 3.5–5.1)
POTASSIUM SERPL-SCNC: 3 MMOL/L (ref 3.5–5.1)
PROT SERPL-MCNC: 7 G/DL (ref 6–8.4)
RBC # BLD AUTO: 4.7 M/UL (ref 4.6–6.2)
SATURATED IRON: 23 % (ref 20–50)
SODIUM SERPL-SCNC: 142 MMOL/L (ref 136–145)
TOTAL IRON BINDING CAPACITY: 271 UG/DL (ref 250–450)
TRANSFERRIN SERPL-MCNC: 183 MG/DL (ref 200–375)
TRIGL SERPL-MCNC: 75 MG/DL (ref 30–150)
VIT B12 SERPL-MCNC: 1385 PG/ML (ref 210–950)
WBC # BLD AUTO: 7.49 K/UL (ref 3.9–12.7)

## 2021-02-08 PROCEDURE — 85025 COMPLETE CBC W/AUTO DIFF WBC: CPT

## 2021-02-08 PROCEDURE — 36415 COLL VENOUS BLD VENIPUNCTURE: CPT

## 2021-02-08 PROCEDURE — 84425 ASSAY OF VITAMIN B-1: CPT

## 2021-02-08 PROCEDURE — 83540 ASSAY OF IRON: CPT

## 2021-02-08 PROCEDURE — 80061 LIPID PANEL: CPT

## 2021-02-08 PROCEDURE — 82607 VITAMIN B-12: CPT

## 2021-02-08 PROCEDURE — 82306 VITAMIN D 25 HYDROXY: CPT

## 2021-02-08 PROCEDURE — 80053 COMPREHEN METABOLIC PANEL: CPT

## 2021-02-09 DIAGNOSIS — E87.6 HYPOKALEMIA: Primary | ICD-10-CM

## 2021-02-09 RX ORDER — POTASSIUM CHLORIDE 20 MEQ/1
20 TABLET, EXTENDED RELEASE ORAL 2 TIMES DAILY
Qty: 6 TABLET | Refills: 0 | Status: SHIPPED | OUTPATIENT
Start: 2021-02-09 | End: 2021-02-12

## 2021-02-10 ENCOUNTER — CLINICAL SUPPORT (OUTPATIENT)
Dept: BARIATRICS | Facility: CLINIC | Age: 56
End: 2021-02-10
Payer: COMMERCIAL

## 2021-02-10 ENCOUNTER — OFFICE VISIT (OUTPATIENT)
Dept: BARIATRICS | Facility: CLINIC | Age: 56
End: 2021-02-10
Payer: COMMERCIAL

## 2021-02-10 VITALS — WEIGHT: 263 LBS | BODY MASS INDEX: 32.87 KG/M2

## 2021-02-10 DIAGNOSIS — Z98.84 S/P LAPAROSCOPIC SLEEVE GASTRECTOMY: ICD-10-CM

## 2021-02-10 DIAGNOSIS — R63.4 WEIGHT LOSS: ICD-10-CM

## 2021-02-10 DIAGNOSIS — I10 HYPERTENSION, UNSPECIFIED TYPE: ICD-10-CM

## 2021-02-10 DIAGNOSIS — E78.5 HYPERLIPIDEMIA, UNSPECIFIED HYPERLIPIDEMIA TYPE: ICD-10-CM

## 2021-02-10 DIAGNOSIS — Z98.890 POST-OPERATIVE STATE: Primary | ICD-10-CM

## 2021-02-10 PROCEDURE — 99499 UNLISTED E&M SERVICE: CPT | Mod: 95,,, | Performed by: DIETITIAN, REGISTERED

## 2021-02-10 PROCEDURE — 99499 NO LOS: ICD-10-PCS | Mod: 95,,, | Performed by: DIETITIAN, REGISTERED

## 2021-02-10 PROCEDURE — 99024 POSTOP FOLLOW-UP VISIT: CPT | Mod: 95,,, | Performed by: PHYSICIAN ASSISTANT

## 2021-02-10 PROCEDURE — 99024 PR POST-OP FOLLOW-UP VISIT: ICD-10-PCS | Mod: 95,,, | Performed by: PHYSICIAN ASSISTANT

## 2021-02-11 ENCOUNTER — TELEPHONE (OUTPATIENT)
Dept: BARIATRICS | Facility: CLINIC | Age: 56
End: 2021-02-11

## 2021-02-12 ENCOUNTER — LAB VISIT (OUTPATIENT)
Dept: LAB | Facility: HOSPITAL | Age: 56
End: 2021-02-12
Attending: PHYSICIAN ASSISTANT
Payer: COMMERCIAL

## 2021-02-12 DIAGNOSIS — E87.6 HYPOKALEMIA: ICD-10-CM

## 2021-02-12 LAB
POTASSIUM SERPL-SCNC: 3.3 MMOL/L (ref 3.5–5.1)
VIT B1 BLD-MCNC: 75 UG/L (ref 38–122)

## 2021-02-12 PROCEDURE — 36415 COLL VENOUS BLD VENIPUNCTURE: CPT

## 2021-02-12 PROCEDURE — 84132 ASSAY OF SERUM POTASSIUM: CPT

## 2021-02-15 ENCOUNTER — PATIENT MESSAGE (OUTPATIENT)
Dept: BARIATRICS | Facility: CLINIC | Age: 56
End: 2021-02-15

## 2021-02-15 DIAGNOSIS — E87.6 HYPOKALEMIA: Primary | ICD-10-CM

## 2021-02-15 RX ORDER — POTASSIUM CHLORIDE 20 MEQ/1
20 TABLET, EXTENDED RELEASE ORAL 2 TIMES DAILY
Qty: 6 TABLET | Refills: 0 | Status: SHIPPED | OUTPATIENT
Start: 2021-02-15 | End: 2021-02-17 | Stop reason: SDUPTHER

## 2021-02-17 DIAGNOSIS — E87.6 HYPOKALEMIA: ICD-10-CM

## 2021-02-17 RX ORDER — POTASSIUM CHLORIDE 20 MEQ/1
20 TABLET, EXTENDED RELEASE ORAL 2 TIMES DAILY
Qty: 6 TABLET | Refills: 0 | Status: SHIPPED | OUTPATIENT
Start: 2021-02-17

## 2021-04-26 ENCOUNTER — TELEPHONE (OUTPATIENT)
Dept: BARIATRICS | Facility: CLINIC | Age: 56
End: 2021-04-26

## 2021-04-27 ENCOUNTER — LAB VISIT (OUTPATIENT)
Dept: LAB | Facility: HOSPITAL | Age: 56
End: 2021-04-27
Attending: PHYSICIAN ASSISTANT
Payer: COMMERCIAL

## 2021-04-27 DIAGNOSIS — E87.6 LOW SERUM POTASSIUM LEVEL: ICD-10-CM

## 2021-04-27 DIAGNOSIS — E66.9 OBESITY (BMI 30-39.9): ICD-10-CM

## 2021-04-27 DIAGNOSIS — I25.10 CORONARY ARTERY DISEASE, ANGINA PRESENCE UNSPECIFIED, UNSPECIFIED VESSEL OR LESION TYPE, UNSPECIFIED WHETHER NATIVE OR TRANSPLANTED HEART: ICD-10-CM

## 2021-04-27 DIAGNOSIS — E66.9 OBESITY, UNSPECIFIED CLASSIFICATION, UNSPECIFIED OBESITY TYPE, UNSPECIFIED WHETHER SERIOUS COMORBIDITY PRESENT: ICD-10-CM

## 2021-04-27 DIAGNOSIS — I10 ESSENTIAL HYPERTENSION: ICD-10-CM

## 2021-04-27 DIAGNOSIS — I10 HYPERTENSION, UNSPECIFIED TYPE: ICD-10-CM

## 2021-04-27 PROCEDURE — 80053 COMPREHEN METABOLIC PANEL: CPT | Performed by: PHYSICIAN ASSISTANT

## 2021-04-27 PROCEDURE — 36415 COLL VENOUS BLD VENIPUNCTURE: CPT | Performed by: PHYSICIAN ASSISTANT

## 2021-04-28 LAB
ALBUMIN SERPL BCP-MCNC: 3.6 G/DL (ref 3.5–5.2)
ALP SERPL-CCNC: 76 U/L (ref 55–135)
ALT SERPL W/O P-5'-P-CCNC: 18 U/L (ref 10–44)
ANION GAP SERPL CALC-SCNC: 11 MMOL/L (ref 8–16)
AST SERPL-CCNC: 19 U/L (ref 10–40)
BILIRUB SERPL-MCNC: 0.3 MG/DL (ref 0.1–1)
BUN SERPL-MCNC: 20 MG/DL (ref 6–20)
CALCIUM SERPL-MCNC: 9.1 MG/DL (ref 8.7–10.5)
CHLORIDE SERPL-SCNC: 104 MMOL/L (ref 95–110)
CO2 SERPL-SCNC: 27 MMOL/L (ref 23–29)
CREAT SERPL-MCNC: 1.7 MG/DL (ref 0.5–1.4)
EST. GFR  (AFRICAN AMERICAN): 50.3 ML/MIN/1.73 M^2
EST. GFR  (NON AFRICAN AMERICAN): 43.5 ML/MIN/1.73 M^2
GLUCOSE SERPL-MCNC: 92 MG/DL (ref 70–110)
POTASSIUM SERPL-SCNC: 3.7 MMOL/L (ref 3.5–5.1)
PROT SERPL-MCNC: 6.8 G/DL (ref 6–8.4)
SODIUM SERPL-SCNC: 142 MMOL/L (ref 136–145)

## 2022-01-28 ENCOUNTER — TELEPHONE (OUTPATIENT)
Dept: BARIATRICS | Facility: CLINIC | Age: 57
End: 2022-01-28
Payer: COMMERCIAL

## 2022-03-07 ENCOUNTER — PATIENT MESSAGE (OUTPATIENT)
Dept: BARIATRICS | Facility: CLINIC | Age: 57
End: 2022-03-07
Payer: COMMERCIAL

## 2022-04-07 ENCOUNTER — PATIENT MESSAGE (OUTPATIENT)
Dept: BARIATRICS | Facility: CLINIC | Age: 57
End: 2022-04-07
Payer: COMMERCIAL

## 2022-04-12 ENCOUNTER — PATIENT MESSAGE (OUTPATIENT)
Dept: BARIATRICS | Facility: CLINIC | Age: 57
End: 2022-04-12
Payer: COMMERCIAL

## 2022-05-05 ENCOUNTER — PATIENT MESSAGE (OUTPATIENT)
Dept: BARIATRICS | Facility: CLINIC | Age: 57
End: 2022-05-05
Payer: COMMERCIAL

## 2022-05-10 ENCOUNTER — PATIENT MESSAGE (OUTPATIENT)
Dept: BARIATRICS | Facility: CLINIC | Age: 57
End: 2022-05-10
Payer: COMMERCIAL

## 2022-05-24 ENCOUNTER — PATIENT MESSAGE (OUTPATIENT)
Dept: BARIATRICS | Facility: CLINIC | Age: 57
End: 2022-05-24
Payer: COMMERCIAL

## 2022-05-24 ENCOUNTER — TELEPHONE (OUTPATIENT)
Dept: BARIATRICS | Facility: CLINIC | Age: 57
End: 2022-05-24
Payer: COMMERCIAL

## 2022-05-24 NOTE — TELEPHONE ENCOUNTER
Attempted to reach pt. In regards to scheduling post op appointment. No answer. Left detailed message. Requested a called back.

## 2022-06-10 ENCOUNTER — PATIENT MESSAGE (OUTPATIENT)
Dept: BARIATRICS | Facility: CLINIC | Age: 57
End: 2022-06-10
Payer: COMMERCIAL

## 2022-06-14 ENCOUNTER — PATIENT MESSAGE (OUTPATIENT)
Dept: BARIATRICS | Facility: CLINIC | Age: 57
End: 2022-06-14
Payer: COMMERCIAL

## 2022-07-05 ENCOUNTER — PATIENT MESSAGE (OUTPATIENT)
Dept: BARIATRICS | Facility: CLINIC | Age: 57
End: 2022-07-05
Payer: COMMERCIAL

## 2022-08-02 ENCOUNTER — PATIENT MESSAGE (OUTPATIENT)
Dept: BARIATRICS | Facility: CLINIC | Age: 57
End: 2022-08-02
Payer: COMMERCIAL

## 2022-08-04 ENCOUNTER — PATIENT MESSAGE (OUTPATIENT)
Dept: BARIATRICS | Facility: CLINIC | Age: 57
End: 2022-08-04
Payer: COMMERCIAL

## 2022-09-07 ENCOUNTER — PATIENT MESSAGE (OUTPATIENT)
Dept: BARIATRICS | Facility: CLINIC | Age: 57
End: 2022-09-07
Payer: COMMERCIAL

## 2022-10-06 ENCOUNTER — PATIENT MESSAGE (OUTPATIENT)
Dept: BARIATRICS | Facility: CLINIC | Age: 57
End: 2022-10-06
Payer: COMMERCIAL

## 2022-11-04 ENCOUNTER — PATIENT MESSAGE (OUTPATIENT)
Dept: BARIATRICS | Facility: CLINIC | Age: 57
End: 2022-11-04
Payer: COMMERCIAL

## 2022-12-07 ENCOUNTER — PATIENT MESSAGE (OUTPATIENT)
Dept: BARIATRICS | Facility: CLINIC | Age: 57
End: 2022-12-07
Payer: COMMERCIAL

## 2023-01-09 ENCOUNTER — PATIENT MESSAGE (OUTPATIENT)
Dept: BARIATRICS | Facility: CLINIC | Age: 58
End: 2023-01-09
Payer: COMMERCIAL

## 2023-02-09 ENCOUNTER — PATIENT MESSAGE (OUTPATIENT)
Dept: BARIATRICS | Facility: CLINIC | Age: 58
End: 2023-02-09
Payer: COMMERCIAL

## 2023-06-24 ENCOUNTER — PATIENT MESSAGE (OUTPATIENT)
Dept: BARIATRICS | Facility: CLINIC | Age: 58
End: 2023-06-24
Payer: COMMERCIAL

## 2023-07-06 ENCOUNTER — PATIENT MESSAGE (OUTPATIENT)
Dept: BARIATRICS | Facility: CLINIC | Age: 58
End: 2023-07-06
Payer: COMMERCIAL

## (undated) DEVICE — SUT GUT PL. 4-0 27 FS-2

## (undated) DEVICE — CLOSURE SKIN STERI STRIP 1/4X3

## (undated) DEVICE — NDL HYPO REG 25G X 1 1/2

## (undated) DEVICE — TUBING HF INSUFFLATION W/ FLTR

## (undated) DEVICE — SHEARS HARMONIC 5CM 36CM

## (undated) DEVICE — TROCAR ENDOPATH XCEL 12X100MM

## (undated) DEVICE — CANNULA ENDOPATH XCEL 5X100MM

## (undated) DEVICE — ELECTRODE REM PLYHSV RETURN 9

## (undated) DEVICE — STAPLER ECHELON FLEX 60MM 44CM

## (undated) DEVICE — Device

## (undated) DEVICE — SUT 0 VICRYL / UR6 (J603)

## (undated) DEVICE — POWDER ARISTA AH 3G

## (undated) DEVICE — RELOAD ECHELON FLEX BLU 60MM

## (undated) DEVICE — TROCAR ENDOPATH XCEL 5X100MM

## (undated) DEVICE — RELOAD ECHELON FLEX GRN 60MM

## (undated) DEVICE — SUT STRATAFIX PDO 2-0 SH

## (undated) DEVICE — APPLICATOR ARISTA FLEX XL

## (undated) DEVICE — BLADE SURG CARBON STEEL SZ11

## (undated) DEVICE — ADHESIVE MASTISOL VIAL 48/BX

## (undated) DEVICE — DRESSING LEUKOPLAST FLEX 1X3IN

## (undated) DEVICE — TROCAR ENDOPATH XCEL 12MM 10CM

## (undated) DEVICE — WARMER DRAPE STERILE LF

## (undated) DEVICE — ADHESIVE DERMABOND ADVANCED

## (undated) DEVICE — LUBRICANT SURGILUBE 2 OZ